# Patient Record
Sex: FEMALE | Race: WHITE | NOT HISPANIC OR LATINO | Employment: FULL TIME | URBAN - METROPOLITAN AREA
[De-identification: names, ages, dates, MRNs, and addresses within clinical notes are randomized per-mention and may not be internally consistent; named-entity substitution may affect disease eponyms.]

---

## 2020-11-02 ENCOUNTER — HOSPITAL ENCOUNTER (EMERGENCY)
Facility: CLINIC | Age: 30
Discharge: HOME | End: 2020-11-02
Attending: FAMILY MEDICINE | Admitting: FAMILY MEDICINE
Payer: OTHER MISCELLANEOUS

## 2020-11-02 ENCOUNTER — APPOINTMENT (OUTPATIENT)
Dept: GENERAL RADIOLOGY | Facility: CLINIC | Age: 30
End: 2020-11-02
Attending: FAMILY MEDICINE
Payer: OTHER MISCELLANEOUS

## 2020-11-02 VITALS
SYSTOLIC BLOOD PRESSURE: 130 MMHG | DIASTOLIC BLOOD PRESSURE: 85 MMHG | TEMPERATURE: 99.1 F | RESPIRATION RATE: 16 BRPM | HEART RATE: 60 BPM | OXYGEN SATURATION: 97 % | WEIGHT: 153 LBS | HEIGHT: 63 IN | BODY MASS INDEX: 27.11 KG/M2

## 2020-11-02 PROCEDURE — 99282 EMERGENCY DEPT VISIT SF MDM: CPT | Performed by: FAMILY MEDICINE

## 2020-11-02 PROCEDURE — 29125 APPL SHORT ARM SPLINT STATIC: CPT | Mod: LT | Performed by: FAMILY MEDICINE

## 2020-11-02 PROCEDURE — 73130 X-RAY EXAM OF HAND: CPT | Mod: LT

## 2020-11-02 PROCEDURE — 250N000013 HC RX MED GY IP 250 OP 250 PS 637: Performed by: FAMILY MEDICINE

## 2020-11-02 PROCEDURE — 99284 EMERGENCY DEPT VISIT MOD MDM: CPT | Mod: 25 | Performed by: FAMILY MEDICINE

## 2020-11-02 RX ORDER — OXYCODONE HYDROCHLORIDE 5 MG/1
5 TABLET ORAL EVERY 6 HOURS PRN
Qty: 12 TABLET | Refills: 0 | Status: SHIPPED | OUTPATIENT
Start: 2020-11-02 | End: 2023-08-13

## 2020-11-02 RX ORDER — IBUPROFEN 400 MG/1
400 TABLET, FILM COATED ORAL ONCE
Status: COMPLETED | OUTPATIENT
Start: 2020-11-02 | End: 2020-11-02

## 2020-11-02 RX ORDER — ACETAMINOPHEN 500 MG
1000 TABLET ORAL ONCE
Status: COMPLETED | OUTPATIENT
Start: 2020-11-02 | End: 2020-11-02

## 2020-11-02 RX ADMIN — IBUPROFEN 400 MG: 400 TABLET ORAL at 04:25

## 2020-11-02 RX ADMIN — ACETAMINOPHEN 1000 MG: 500 TABLET, FILM COATED ORAL at 04:25

## 2020-11-02 ASSESSMENT — MIFFLIN-ST. JEOR: SCORE: 1373.13

## 2020-11-02 NOTE — DISCHARGE INSTRUCTIONS
Thumb spica splint until seen in sports medicine clinic with Dr. Han.  In addition to the thumb spica splint rest, ice, elevation as possible over the next 48 hours.  Workability given.  Tylenol/ibuprofen as baseline pain medication, would recommend the sparing use of oxycodone if needed for breakthrough pain.  Return to the emergency department if worse or changes.

## 2020-11-02 NOTE — ED PROVIDER NOTES
"  History   No chief complaint on file.  Hand injury  HPI  Marilou Nicolas is a 30 year old female, past medical history is unremarkable, presents to the emergency department with concerns of injury to her left hand at around 330 this morning while at work.  The patient reports she was lifting a pallet and pushed back against her causing forced extension of the left thumb she heard a cracking snap and had immediate pain in the area of the thenar eminence by description.  No other injuries.  She has taken nothing for pain.      Allergies:  Allergies   Allergen Reactions     Penicillins Other (See Comments)       Problem List:    There are no active problems to display for this patient.       Past Medical History:    No past medical history on file.    Past Surgical History:    No past surgical history on file.    Family History:    No family history on file.    Social History:  Marital Status:   [2]  Social History     Tobacco Use     Smoking status: Not on file   Substance Use Topics     Alcohol use: Not on file     Drug use: Not on file        Medications:         oxyCODONE (ROXICODONE) 5 MG tablet          Review of Systems   All other systems reviewed and are negative.      Physical Exam   BP: (!) 151/96  Pulse: 60  Temp: 99.1  F (37.3  C)  Resp: 18  Height: 160 cm (5' 3\")  Weight: 69.4 kg (153 lb)  SpO2: 99 %      Physical Exam  Vitals signs and nursing note reviewed.   Constitutional:       Appearance: Normal appearance.   Musculoskeletal:        Hands:    Neurological:      Mental Status: She is alert.         ED Course        Procedures               Critical Care time:  none               Results for orders placed or performed during the hospital encounter of 11/02/20 (from the past 24 hour(s))   XR Hand Left G/E 3 Views    Narrative    EXAM: XR HAND LT G/E 3 VW  LOCATION[facility]  DATE/TIME: 11/2/2020 4:50 AM    INDICATION: Hyperextension of the thumb.  COMPARISON: None.      " Impression    IMPRESSION: No displaced fracture or dislocation. Consider MRI for further evaluation if there is concern for ligamentous or tendinous injury.       Medications   ibuprofen (ADVIL/MOTRIN) tablet 400 mg (400 mg Oral Given 11/2/20 0425)   acetaminophen (TYLENOL) tablet 1,000 mg (1,000 mg Oral Given 11/2/20 0425)     5:32 AM  Patient is relatively comfortable after medications given.  We reviewed her negative x-ray.  She is still unwilling to move the thumb voluntarily.    Assessments & Plan (with Medical Decision Making)   30-year-old female past medical history reviewed as above who presents with a work-related injury to her left thumb as described in the HPI documented with respect to limited exam given patient discomfort and cooperation.  X-ray of the traumatized area is acutely negative however by her description would be suspicious of potential of tissue injury such as tendon tear or rupture.  Difficult to examine acutely given the patient cooperation, I recommended thumb spica splint, rest ice compression elevation and follow-up in clinic with sports medicine after this is had a chance to settle down a little bit.  At that time a better exam can be performed in a decision as to further potential soft tissue imaging such as MRI considered.  I have given her referral to Dr. Michael Han sports medicine.  Recommended nonnarcotic pain medication such as Tylenol/ibuprofen as baseline and oxycodone for breakthrough pain.  Return to the emergency department if worse or changes in interim.  Workability was completed.    Disclaimer: This note consists of symbols derived from keyboarding, dictation and/or voice recognition software. As a result, there may be errors in the script that have gone undetected. Please consider this when interpreting information found in this chart.      I have reviewed the nursing notes.    I have reviewed the findings, diagnosis, plan and need for follow up with the patient.        New Prescriptions    OXYCODONE (ROXICODONE) 5 MG TABLET    Take 1 tablet (5 mg) by mouth every 6 hours as needed for pain       Final diagnoses:   Strain of tendon of thumb       11/2/2020   [facility] EMERGENCY DEPT     [facility] CenterSteven reed MD  11/04/20 2918

## 2020-11-02 NOTE — ED TRIAGE NOTES
Pt c/o  Left hand/arm pain. Pt stated while at work approximately 0330  Was lifting a pallet and felt her hand/arm snap. Rating pain 10/10 on the pain scale . Pt able to move all the fingers except the thumb.

## 2020-11-03 ENCOUNTER — OFFICE VISIT (OUTPATIENT)
Dept: ORTHOPEDICS | Facility: CLINIC | Age: 30
End: 2020-11-03
Payer: OTHER MISCELLANEOUS

## 2020-11-03 VITALS
BODY MASS INDEX: 27.11 KG/M2 | WEIGHT: 153 LBS | DIASTOLIC BLOOD PRESSURE: 80 MMHG | HEIGHT: 63 IN | SYSTOLIC BLOOD PRESSURE: 130 MMHG

## 2020-11-03 DIAGNOSIS — S69.92XA INJURY OF LEFT THUMB, INITIAL ENCOUNTER: Primary | ICD-10-CM

## 2020-11-03 PROCEDURE — 99204 OFFICE O/P NEW MOD 45 MIN: CPT | Performed by: PEDIATRICS

## 2020-11-03 ASSESSMENT — MIFFLIN-ST. JEOR: SCORE: 1373.13

## 2020-11-03 NOTE — LETTER
REPORT OF WORK ABILITY    NOTE TO EMPLOYEE: You must promptly provide a copy of this report to your  employer or worker's compensation insurer, and Qualified Rehabilitation Consultant.    Date: 11/3/2020                     Employee Name: Marilou Nicolas         YOB: 1990  Medical Record Number: [id number]   Soc.Sec.No: (Not on file)  Employer: None                Date of Injury: 11/2/20  Managed Care Organization / Insurance Company Name: UNKNOWN    Diagnosis: left thumb injury, concern for soft tissue injury at thumb MCP joint  Work Related: yes     MMI: NO   Permanent Partial Disability(PPD) likely: UNKNOWN    EMPLOYEE IS ABLE TO WORK: Return to work with restrictions on next scheduled work date.     RESTRICTIONS IF ANY:    Lift, carry: none on left  Push/Pull:  None on left  Hand/Wrist:  left no gripping/grasping, no outstretched arms, no repetitive motion and No operating machine/vibrating tools  Basically no use left hand.  Ladder/Stair climb:  Not at all (0 hours) ladders  Reach Above Shoulders:  Not at all (0 hours) left    OTHER RESTRICTIONS: None    TREATMENT PLAN/NOTES: change work position for comfort, best work height mid chest to mid thigh, may need to restrict work activities for comfort, MRI - as ordered, continue thumb spica wrist brace and cold pack for comfort, Elevate and Rest.            Michael COMBS.

## 2020-11-03 NOTE — LETTER
11/3/2020         RE: Marilou Nicolas  [address] [place] [address]        Dear Colleague,    Thank you for referring your patient, Marilou Nicolas, to the [facility]. Please see a copy of my visit note below.    Sports Medicine Clinic Visit    PCP: [facility]    Marilou Nicolas is a 30 year old female who is seen  as an ER referral presenting with a left thumb injury that occurred at work yesterday 11/2/20.  She was lifting a pallet when it caused her left thumb to hyperextend.  She heard a crack and a snap and had pain her thumb.  She was seen at the ER, x rays were taken and she was placed in a thumb spica wrist brace.  It was recommended she have an MRI.   She is right hand dominant.     Injury: forced hyperextension of thumb  **  Felt and heard a crack at time of injury, noting that thumb was hyperextended. + pain and swelling right away.  No additional noise since injury.  Has had some bruising.    Injury is work comp.    Location of Pain: left thumb MCP joint   Duration of Pain: 1 day(s)  Rating of Pain at worst: 10/10  Rating of Pain Currently: 7/10  Symptoms are better with: Ice, Rest and Elevation  Symptoms are worse with: movement, use  Additional Features:   Positive: popping   Negative: swelling, bruising, grinding, catching, locking, instability, paresthesias, numbness, weakness, pain in other joints and systemic symptoms  Other evaluation and/or treatments so far consists of: x rays, splinting   Prior History of related problems: HX of laceration at thenar eminence ~11 years, no previous injury to this joint    Social History: [employer]     Review of Systems  Musculoskeletal: as above  Remainder of review of systems is negative including constitutional, CV, pulmonary, GI, Skin and Neurologic except as noted in HPI or medical history.    History reviewed. No pertinent past medical history.  Past Surgical History:  Continue Lasix and Cardizem "  Procedure Laterality Date     KIDNEY SURGERY  2016     Fam hx: noncontributory    Social History     Socioeconomic History     Marital status:      Spouse name: Not on file     Number of children: Not on file     Years of education: Not on file     Highest education level: Not on file   Occupational History     Not on file   Social Needs     Financial resource strain: Not on file     Food insecurity     Worry: Not on file     Inability: Not on file     Transportation needs     Medical: Not on file     Non-medical: Not on file   Tobacco Use     Smoking status: Never Smoker     Smokeless tobacco: Never Used   Substance and Sexual Activity     Alcohol use: Not on file     Drug use: Not on file     Sexual activity: Not on file   Lifestyle     Physical activity     Days per week: Not on file     Minutes per session: Not on file     Stress: Not on file   Relationships     Social connections     Talks on phone: Not on file     Gets together: Not on file     Attends Yazidism service: Not on file     Active member of club or organization: Not on file     Attends meetings of clubs or organizations: Not on file     Relationship status: Not on file     Intimate partner violence     Fear of current or ex partner: Not on file     Emotionally abused: Not on file     Physically abused: Not on file     Forced sexual activity: Not on file   Other Topics Concern     Not on file   Social History Narrative     Not on file       Objective  /80   Ht 1.6 m (5' 3\")   Wt 69.4 kg (153 lb)   BMI 27.10 kg/m      GENERAL APPEARANCE: healthy, alert and no distress   GAIT: NORMAL  SKIN: no suspicious lesions or rashes  NEURO: Normal strength and tone, mentation intact and speech normal  PSYCH:  mentation appears normal and affect normal/bright  HEENT: no scleral icterus  CV: distal perfusion intact  RESP: nonlabored breathing    Exam:    Hand/wrist (left):    Inspection:  No deformity noted.  Mild diffuse radial hand swelling, " more around thumb MCP joint and into thumb. Some thumb MCP joint area ecchymosis.    Motion:  Forearm, wrist grossly full  Digit motion quite limited thumb actively; has minimal active flexion, extension, slightly greater abduction/adduction at thumb; all limited with pain    Strength:  deferred    Sensation:  Grossly intact light touch.    Radial pulses normal, +2/4, capillary refill brisk.    Palpation:  Tender diffuse radial hand, more around thumb MCP joint    Unable to perform stress at thumb MCP collaterals with pain      Skin:       well perfused       capillary refill brisk    Radiology:  Visualized radiographs as noted below, and reviewed the images with the patient; if the report was available at the time of the visit, the report was reviewed as well.      Results for orders placed or performed during the hospital encounter of 11/02/20   XR Hand Left G/E 3 Views    Narrative    EXAM: XR HAND LT G/E 3 VW  LOCATION: [facility]  DATE/TIME: 11/2/2020 4:50 AM    INDICATION: Hyperextension of the thumb.  COMPARISON: None.      Impression    IMPRESSION: No displaced fracture or dislocation. Consider MRI for further evaluation if there is concern for ligamentous or tendinous injury.       Assessment:  1. Injury of left thumb, initial encounter         Plan:  Discussed the assessment with the patient.  Concern for thumb MCP joint derangement given nature of the injury, current symptoms.  This may include gamekeeper injury.  We discussed the following: symptom treatment, activity modification/rest, imaging and support for the affected area. Following discussion, plan:  Icing, elevation, OTC medication as needed.  She already has pain medication from ED.  We discussed primarily options of monitoring, routine use of thumb spica wrist brace, and recheck; versus additional imaging with MRI.  Given the assessment, with concern for joint integrity, plan MRI of the area next.  Order placed.  Limit work  activities in the meantime; workability letter provided today.  Follow up: Contact patient with MRI results.  Questions answered. Discussed signs and symptoms that may indicate more serious issues; the patient was instructed to seek appropriate care if noted. Marilou indicates understanding of these issues and agrees with the plan.      Michael Montoya DO, CAQ                Patient Instructions   Continue with brace for support  Icing, elevation, rest from use of hand  Letter for work  Plan MRI of this area next; will plan to contact you with results     Advanced imaging is done by appointment. Some insurance require a prior authorization to be completed which may delay the time until you are able to schedule your appointment.    [facility] [place] [place] [facility] [phone] to schedule your MRI.  Depending on your availability you can usually schedule within the next 1-2 days.    The clinic will call you with results, if you have not heard from the clinic within 3-4 days following your MRI please contact us at the number listed below.   If you have any further questions for your physician or physician s care team you can call [phone] and use option 3 to leave a voice message. Calls received during business hours will be returned same day.                This note consists of symbols derived from keyboarding, dictation and/or voice recognition software. As a result, there may be errors in the script that have gone undetected. Please consider this when interpreting information found in this chart.          Again, thank you for allowing me to participate in the care of your patient.        Sincerely,        Michael Montoya DO

## 2020-11-03 NOTE — PROGRESS NOTES
Sports Medicine Clinic Visit    PCP: [facility], [facility]    Marilou Nicolas is a 30 year old female who is seen  as an ER referral presenting with a left thumb injury that occurred at work yesterday 11/2/20.  She was lifting a pallet when it caused her left thumb to hyperextend.  She heard a crack and a snap and had pain her thumb.  She was seen at the ER, x rays were taken and she was placed in a thumb spica wrist brace.  It was recommended she have an MRI.   She is right hand dominant.     Injury: forced hyperextension of thumb  **  Felt and heard a crack at time of injury, noting that thumb was hyperextended. + pain and swelling right away.  No additional noise since injury.  Has had some bruising.    Injury is work comp.    Location of Pain: left thumb MCP joint   Duration of Pain: 1 day(s)  Rating of Pain at worst: 10/10  Rating of Pain Currently: 7/10  Symptoms are better with: Ice, Rest and Elevation  Symptoms are worse with: movement, use  Additional Features:   Positive: popping   Negative: swelling, bruising, grinding, catching, locking, instability, paresthesias, numbness, weakness, pain in other joints and systemic symptoms  Other evaluation and/or treatments so far consists of: x rays, splinting   Prior History of related problems: HX of laceration at thenar eminence ~11 years, no previous injury to this joint    Social History: [employer]     Review of Systems  Musculoskeletal: as above  Remainder of review of systems is negative including constitutional, CV, pulmonary, GI, Skin and Neurologic except as noted in HPI or medical history.    History reviewed. No pertinent past medical history.  Past Surgical History:   Procedure Laterality Date     KIDNEY SURGERY  2016     [facility] hx: noncontributory    Social History     Socioeconomic History     Marital status:      Spouse name: Not on file     Number of children: Not on file     Years of education: Not on file     Highest education  "level: Not on file   Occupational History     Not on file   Social Needs     Financial resource strain: Not on file     Food insecurity     Worry: Not on file     Inability: Not on file     Transportation needs     Medical: Not on file     Non-medical: Not on file   Tobacco Use     Smoking status: Never Smoker     Smokeless tobacco: Never Used   Substance and Sexual Activity     Alcohol use: Not on file     Drug use: Not on file     Sexual activity: Not on file   Lifestyle     Physical activity     Days per week: Not on file     Minutes per session: Not on file     Stress: Not on file   Relationships     Social connections     Talks on phone: Not on file     Gets together: Not on file     Attends Buddhist service: Not on file     Active member of club or organization: Not on file     Attends meetings of clubs or organizations: Not on file     Relationship status: Not on file     Intimate partner violence     Fear of current or ex partner: Not on file     Emotionally abused: Not on file     Physically abused: Not on file     Forced sexual activity: Not on file   Other Topics Concern     Not on file   Social History Narrative     Not on file       Objective  /80   Ht 1.6 m (5' 3\")   Wt 69.4 kg (153 lb)   BMI 27.10 kg/m      GENERAL APPEARANCE: healthy, alert and no distress   GAIT: NORMAL  SKIN: no suspicious lesions or rashes  NEURO: Normal strength and tone, mentation intact and speech normal  PSYCH:  mentation appears normal and affect normal/bright  HEENT: no scleral icterus  CV: distal perfusion intact  RESP: nonlabored breathing    Exam:    Hand/wrist (left):    Inspection:  No deformity noted.  Mild diffuse radial hand swelling, more around thumb MCP joint and into thumb. Some thumb MCP joint area ecchymosis.    Motion:  Forearm, wrist grossly full  Digit motion quite limited thumb actively; has minimal active flexion, extension, slightly greater abduction/adduction at thumb; all limited with " pain    Strength:  deferred    Sensation:  Grossly intact light touch.    Radial pulses normal, +2/4, capillary refill brisk.    Palpation:  Tender diffuse radial hand, more around thumb MCP joint    Unable to perform stress at thumb MCP collaterals with pain      Skin:       well perfused       capillary refill brisk    Radiology:  Visualized radiographs as noted below, and reviewed the images with the patient; if the report was available at the time of the visit, the report was reviewed as well.      Results for orders placed or performed during the hospital encounter of 11/02/20   XR Hand Left G/E 3 Views    Narrative    EXAM: XR HAND LT G/E 3 VW  LOCATION: [facility]  DATE/TIME: 11/2/2020 4:50 AM    INDICATION: Hyperextension of the thumb.  COMPARISON: None.      Impression    IMPRESSION: No displaced fracture or dislocation. Consider MRI for further evaluation if there is concern for ligamentous or tendinous injury.       Assessment:  1. Injury of left thumb, initial encounter         Plan:  Discussed the assessment with the patient.  Concern for thumb MCP joint derangement given nature of the injury, current symptoms.  This may include gamekeeper injury.  We discussed the following: symptom treatment, activity modification/rest, imaging and support for the affected area. Following discussion, plan:  Icing, elevation, OTC medication as needed.  She already has pain medication from ED.  We discussed primarily options of monitoring, routine use of thumb spica wrist brace, and recheck; versus additional imaging with MRI.  Given the assessment, with concern for joint integrity, plan MRI of the area next.  Order placed.  Limit work activities in the meantime; workability letter provided today.  Follow up: Contact patient with MRI results.  Questions answered. Discussed signs and symptoms that may indicate more serious issues; the patient was instructed to seek appropriate care if noted. Marilou  indicates understanding of these issues and agrees with the plan.      Michael Montoya, DO, CAQ                Patient Instructions   Continue with brace for support  Icing, elevation, rest from use of hand  Letter for work  Plan MRI of this area next; will plan to contact you with results     Advanced imaging is done by appointment. Some insurance require a prior authorization to be completed which may delay the time until you are able to schedule your appointment.    Please call [place], [place] and [place]: [phone] to schedule your MRI.  Depending on your availability you can usually schedule within the next 1-2 days.    The clinic will call you with results, if you have not heard from the clinic within 3-4 days following your MRI please contact us at the number listed below.   If you have any further questions for your physician or physician s care team you can call [phone] and use option 3 to leave a voice message. Calls received during business hours will be returned same day.                This note consists of symbols derived from keyboarding, dictation and/or voice recognition software. As a result, there may be errors in the script that have gone undetected. Please consider this when interpreting information found in this chart.

## 2020-11-03 NOTE — PATIENT INSTRUCTIONS
Continue with brace for support  Icing, elevation, rest from use of hand  Letter for work  Plan MRI of this area next; will plan to contact you with results     Advanced imaging is done by appointment. Some insurance require a prior authorization to be completed which may delay the time until you are able to schedule your appointment.    Please call [place], [place] and [place]: [phone] to schedule your MRI.  Depending on your availability you can usually schedule within the next 1-2 days.    The clinic will call you with results, if you have not heard from the clinic within 3-4 days following your MRI please contact us at the number listed below.   If you have any further questions for your physician or physician s care team you can call [phone] and use option 3 to leave a voice message. Calls received during business hours will be returned same day.

## 2020-11-04 ENCOUNTER — TELEPHONE (OUTPATIENT)
Dept: ORTHOPEDICS | Facility: CLINIC | Age: 30
End: 2020-11-04

## 2020-11-04 ENCOUNTER — HOSPITAL ENCOUNTER (OUTPATIENT)
Dept: MRI IMAGING | Facility: CLINIC | Age: 30
Discharge: HOME | End: 2020-11-04
Attending: PEDIATRICS | Admitting: PEDIATRICS
Payer: OTHER MISCELLANEOUS

## 2020-11-04 DIAGNOSIS — S69.92XA INJURY OF LEFT THUMB, INITIAL ENCOUNTER: ICD-10-CM

## 2020-11-04 PROCEDURE — 73221 MRI JOINT UPR EXTREM W/O DYE: CPT | Mod: 26 | Performed by: RADIOLOGY

## 2020-11-04 PROCEDURE — 73221 MRI JOINT UPR EXTREM W/O DYE: CPT | Mod: LT

## 2020-11-04 NOTE — TELEPHONE ENCOUNTER
Results for orders placed or performed during the hospital encounter of 11/04/20   MR Finger Left w/o Contrast    Narrative    MR left thumb without contrast 11/4/2020 9:40 AM    Techniques: Multiplanar multisequence imaging of the left hand with  field-of-view centered on the thumb was obtained without  administration of intra-articular or intravenous contrast using  routing protocol.    History: Pain at the first metacarpal phalangeal joint suspect  gamekeeper injury.    Comparison: Radiograph 11/2/2020.    Findings:    The proper ulnar collateral ligament is intact. The accessory ulnar  collateral ligament is intact. Mild regional soft tissue edema  superficial to the ulnar collateral ligament. There is mild regional  edema primarily at the level of the first metacarpal phalangeal joint,  especially volarly medially. The radial collateral ligament is intact.    Mild intramuscular edema in the opponens pollicis and flexor pollicis  brevis muscles.    Bones: No fracture or abnormal marrow infiltration.    Tendons:  Flexor tendons: Tenosynovial fluid surrounding the flexor hallucis  longus at the level of the distal metacarpal and metacarpal phalangeal  joint. No tear or tendon sheath effusion.  Extensor tendons: Tenosynovial fluid surrounding the extensor pollicis  brevis tendon. Remaining extensor tendons are intact.      Impression    IMPRESSION:    1. The proper and accessory ulnar collateral ligaments of the first  metacarpophalangeal joint are intact with surrounding soft tissue  edema suggestive of mild sprain. There is also mild regional soft  tissue edema around the first metacarpal phalangeal joint and strains  of opponens pollicis and flexor pollicis brevis muscles.    2. Mild tenosynovitis of the flexor pollicis longus and extensor  pollicis brevis tendons.    I have personally reviewed the examination and initial interpretation  and I agree with the findings.    TARUN TORRES MD (Joe)

## 2020-11-04 NOTE — LETTER
November 12, 2020      Marilou S Fidencio  [address] [place] [address]        Dear ,    We are writing to inform you of your test results.    Please make an appointment with your provider to review or follow up on your test results.  Appointments can be made by calling [phone].    No results found from the In stiQRd message.    If you have any questions or concerns, please call the clinic at the number listed above.       Sincerely,        Michael Montoya, DO

## 2020-11-04 NOTE — TELEPHONE ENCOUNTER
Strain/sprain. No tear noted.  Would advise hand therapy next. May use brace for comfort in the meantime.  Would continue with work restrictions until recheck, in 3-4 weeks; can update letter to reflect that timeline if needed.  Thanks.  Michael Montoya, DO, CAQ

## 2020-11-05 NOTE — TELEPHONE ENCOUNTER
Tried contacting patient to review results.  No answer and patient's voicemail was full, unable to leave message.  Will have clinic staff try contacting patient later today.       Phone Number patient can be reached at:  Cell number on file:    Telephone Information:   Mobile [phone]     Jaylen Carrizales ATC

## 2020-11-06 ENCOUNTER — TELEPHONE (OUTPATIENT)
Dept: ORTHOPEDICS | Facility: CLINIC | Age: 30
End: 2020-11-06

## 2020-11-06 NOTE — TELEPHONE ENCOUNTER
Reason for call:  Other   Patient called regarding (reason for call): Pt has been trying to reach provider regarding image results  Additional comments:     Phone number to reach patient:  Home number on file [phone] (home)    Best Time:  Morning or evening    Can we leave a detailed message on this number?  YES    Travel screening: Not Applicable

## 2020-11-09 NOTE — TELEPHONE ENCOUNTER
Attempted to call both home (mailbox full) and mobile numbers (busy).  Unable to reach patient.  Will attempt again later  Cassandra Dawson MS ATC

## 2020-11-10 NOTE — TELEPHONE ENCOUNTER
Attempted to contact patient, unable to leave message as mailbox is full.     Cassandra Dawson MS ATC

## 2020-11-10 NOTE — TELEPHONE ENCOUNTER
Telephone message sent to incorrect pool:      Reason for call:  Other   Patient called regarding (reason for call): Pt has been trying to reach provider regarding image results  Additional comments:     Phone number to reach patient:  Home number on file [phone] (home)    Best Time:  Morning or evening    Can we leave a detailed message on this number?  YES    Travel screening: Not Applicable

## 2020-11-13 NOTE — TELEPHONE ENCOUNTER
Sent patient MyChart, asking if she would like to receive results via [facility]. Will wait for response.    Sintia Queen, ATC

## 2020-11-16 ENCOUNTER — HEALTH MAINTENANCE LETTER (OUTPATIENT)
Age: 30
End: 2020-11-16

## 2020-11-24 ENCOUNTER — THERAPY VISIT (OUTPATIENT)
Dept: OCCUPATIONAL THERAPY | Facility: CLINIC | Age: 30
End: 2020-11-24
Attending: PEDIATRICS
Payer: OTHER MISCELLANEOUS

## 2020-11-24 DIAGNOSIS — S69.92XA INJURY OF LEFT THUMB, INITIAL ENCOUNTER: ICD-10-CM

## 2020-11-24 DIAGNOSIS — M79.645 PAIN OF LEFT THUMB: Primary | ICD-10-CM

## 2020-11-24 PROCEDURE — 97165 OT EVAL LOW COMPLEX 30 MIN: CPT | Mod: GO | Performed by: OCCUPATIONAL THERAPIST

## 2020-11-24 PROCEDURE — 97110 THERAPEUTIC EXERCISES: CPT | Mod: GO | Performed by: OCCUPATIONAL THERAPIST

## 2020-11-24 PROCEDURE — 97760 ORTHOTIC MGMT&TRAING 1ST ENC: CPT | Mod: GO | Performed by: OCCUPATIONAL THERAPIST

## 2020-11-24 NOTE — PROGRESS NOTES
Hand Therapy Initial Evaluation    Current Date:  11/24/2020    Subjective:  Marilou Nicolas is a 30 year old right hand dominant female.    Diagnosis:   Left thumb injury  DOI:  11/2/2020  Post:  3w 1d    Patient reports symptoms of pain, stiffness/loss of motion, weakness/loss of strength, edema and numbness of the left thumb which occurred due to hyperextending thumb while lifting at work. Since onset symptoms are gradually getting better.  Special tests:  x-ray negative for fracture and MRI negative for UCL injury but mild tenosynovitis of FPL and EPB.  Previous treatment: OTC SUSANNA thumb spica full time.  General health as reported by patient is good.  Pertinent medical history includes:Depression, Kidney Disease, Migraines/Headaches  Medical allergies:see list in EMR.  Surgical history: other: kidney.  Medication history: None.    Occupational Profile Information:  Current occupation is over   Currently working in normal job with restrictions  Job Tasks: Lifting, Carrying, Prolonged Standing, Pushing, Pulling, Repetitive Tasks  Prior functional level:  independent-all household chores  Barriers include:none  Mobility: No difficulty  Transportation: drives    Functional Outcome Measure:  Upper Extremity Functional Index  SCORE:   Column Totals: 32/80  (A lower score indicates greater disability.)    Objective:  Pain Level (Scale 0-10):   11/24/2020   At Rest 4-5   With Use 7-8   Worst 10     Pain Description:  Date 11/24/2020   Location wrist and thumb   Pain Quality Sharp and numb   Frequency constant     Pain is worst  daytime   Exacerbated by  using thumb, cold   Relieved by rest   Progression Gradually improving      Sensation   Decreased in tip of thumb per pt report but has improved since DOI    Edema  Mild of thumb, swelling has decreased since DOI per pt report     ROM  Thumb 11/24/2020 11/24/2020   AROM  (PROM) Right Left   MP 0/30 0/10   IP +15/75 0/5   RABD 53 25   PABD 45 25    Retropulsion 5 cm 2 cm   Kapandji Opposition Scale (0-10/10) 9/10 4/10     Wrist 11/24/2020 11/24/2020   AROM (PROM) Right Left   Extension 65 40   Flexion 65 25   RD 25 20   UD 35 35     Resisted Testing  Pain Report: - none  + mild    ++ moderate    +++ severe    11/24/2020   APL +   EPB +   EPL +   FPL + slight     Strength   (Measured in pounds)  Deferred due to pain    Palpation  Pain Report:  - none    + mild    ++ moderate    +++ severe    11/24/2020   Radial Styloid -   1st DC -   FCR + slight   Thumb CMC + slight   A1 pulley thumb +   Extensor Wad -   Thenars +   Thumb MCP +   Thumb IP +     Assessment:  Patient presents with symptoms consistent with diagnosis of left thumb injury, with conservative intervention.     Patient's limitations or Problem List includes:  Pain, Decreased ROM/motion, Increased edema, Weakness and Sensory disturbance of the left thumb and wrist which interferes with the patient's ability to perform Self Care Tasks (dressing, eating, bathing), Work Tasks, Sleep Patterns, Recreational Activities, Household Chores and Driving  as compared to previous level of function.    Rehab Potential:  Excellent - Return to full activity, no limitations    Patient will benefit from skilled Occupational Therapy to increase ROM, overall strength,  strength, pinch strength and sensation and decrease pain and edema to return to previous activity level and resume normal daily tasks and to reach their rehab potential.    Barriers to Learning:  No barrier    Communication Issues:  Patient appears to be able to clearly communicate and understand verbal and written communication and follow directions correctly.    Chart Review: Chart Review and Simple history review with patient    Identified Performance Deficits: bathing/showering, dressing, hygiene and grooming, driving and community mobility, home establishment and management, meal preparation and cleanup, sleep, work and leisure activities     Assessment of Occupational Performance:  5 or more Performance Deficits    Clinical Decision Making (Complexity): Low complexity    Treatment Explanation:  The following has been discussed with the patient:  RX ordered/plan of care  Anticipated outcomes  Possible risks and side effects    Plan:  Frequency:  1 X week, once daily  Duration:  for 6 weeks    Treatment Plan:    Modalities:    US and Paraffin   Therapeutic Exercise:    AROM, AAROM, PROM, Tendon Gliding, Blocking, Isotonics, Isometrics and Stabilization  Neuromuscular re-ed:   Nerve Gliding and Kinesiotaping  Manual Techniques:   Friction massage and Myofascial release  Orthotic Fabrication:    Forearm based orthosis  Self Care:    Self Care Tasks    Discharge Plan:  Achieve all LTG.  Independent in home treatment program.  Reach maximal therapeutic benefit.    Discharge Plan:    Achieve all LTG.  Independent in home treatment program.  Reach maximal therapeutic benefit.    Home Program:   Warmth for stiffness  Ice after activity for pain  Self TFM to 1st dorsal compartment  Self MFR to EPB/ABL  AROM of wrist and thumb  Ot[place]ock thumb spica orthosis sleeping and per symptoms day  Avoid activities that exacerbate pain in the wrist or thumb    Next Visit:  See in 1 week  Assess response to HEP and otobock orthosis  Progress to PROM for thumb and wrist and strengthening as tolerated

## 2020-12-01 ENCOUNTER — THERAPY VISIT (OUTPATIENT)
Dept: OCCUPATIONAL THERAPY | Facility: CLINIC | Age: 30
End: 2020-12-01
Payer: OTHER MISCELLANEOUS

## 2020-12-01 DIAGNOSIS — M79.645 PAIN OF LEFT THUMB: ICD-10-CM

## 2020-12-01 DIAGNOSIS — S69.92XA INJURY OF LEFT THUMB, INITIAL ENCOUNTER: ICD-10-CM

## 2020-12-01 PROCEDURE — 97140 MANUAL THERAPY 1/> REGIONS: CPT | Mod: GO | Performed by: OCCUPATIONAL THERAPIST

## 2020-12-01 PROCEDURE — 97110 THERAPEUTIC EXERCISES: CPT | Mod: GO | Performed by: OCCUPATIONAL THERAPIST

## 2020-12-01 NOTE — PROGRESS NOTES
SOAP note objective information for 12/1/2020:    Diagnosis:   Left thumb injury  DOI:  11/2/2020  Post:  4w 1d    Pain Level (Scale 0-10):   11/24/2020 12/1/2020   At Rest 4-5 4-5   With Use 7-8 5-6   Worst 10 8     ROM  Thumb 11/24/2020 11/24/2020 12/1/2020   AROM  (PROM) Right Left Left   MP 0/30 0/10 /25   IP +15/75 0/5 /20   RABD 53 25    PABD 45 25    Retropulsion 5 cm 2 cm    Kapandji Opposition Scale (0-10/10) 9/10 4/10 6/10     Wrist 11/24/2020 11/24/2020 12/1/2020   AROM (PROM) Right Left Left   Extension 65 40 65   Flexion 65 25 50   RD 25 20    UD 35 35      Home Program:   Warmth for stiffness  Ice after activity for pain  Self TFM to 1st dorsal compartment  Self MFR to EPB/ABL and thenars   AROM of wrist and thumb  Ot[place] thumb spica orthosis sleeping and per symptoms day  Avoid activities that exacerbate pain in the wrist or thumb    Next Visit:  See in 1 week  Assess response to MFR to thenars, consider US if continued tenderness  Progress to PROM for thumb and wrist and strengthening as tolerated     Please refer to the daily flowsheet for treatment today, total treatment time and time spent performing 1:1 timed codes.

## 2020-12-08 ENCOUNTER — THERAPY VISIT (OUTPATIENT)
Dept: OCCUPATIONAL THERAPY | Facility: CLINIC | Age: 30
End: 2020-12-08
Payer: OTHER MISCELLANEOUS

## 2020-12-08 DIAGNOSIS — S69.92XA INJURY OF LEFT THUMB, INITIAL ENCOUNTER: ICD-10-CM

## 2020-12-08 DIAGNOSIS — M79.645 PAIN OF LEFT THUMB: ICD-10-CM

## 2020-12-08 PROCEDURE — 97140 MANUAL THERAPY 1/> REGIONS: CPT | Mod: GO | Performed by: OCCUPATIONAL THERAPIST

## 2020-12-08 PROCEDURE — 97110 THERAPEUTIC EXERCISES: CPT | Mod: GO | Performed by: OCCUPATIONAL THERAPIST

## 2020-12-08 NOTE — PROGRESS NOTES
SOAP note objective information for 12/8/2020:    Diagnosis:   Left thumb injury  DOI:  11/2/2020  Post:  5w 1d    Pain Level (Scale 0-10):   11/24/2020 12/1/2020 12/8/2020   At Rest 4-5 4-5 0   With Use 7-8 5-6 5   Worst 10 8 5     ROM  Thumb 11/24/2020 11/24/2020 12/1/2020 12/8/2020   AROM  (PROM) Right Left Left Left   MP 0/30 0/10 /25 /20   IP +15/75 0/5 /20 /55   RABD 53 25     PABD 45 25     Retropulsion 5 cm 2 cm     Kapandji Opposition Scale (0-10/10) 9/10 4/10 6/10 7/10     Wrist 11/24/2020 11/24/2020 12/1/2020 12/8/2020   AROM (PROM) Right Left Left Left   Extension 65 40 65 65   Flexion 65 25 50 60   RD 25 20     UD 35 35       Strength   (Measured in pounds)  Pain Report: - none  + mild    ++ moderate    +++ severe    12/8/2020 12/8/2020   Trials Right Left   1  2  3 54  52  44 12-  14-  15-   Average 50 14     Lat Pinch 12/8/2020 12/8/2020   Trials Right Left   1  2  3 16  16  16 7+  9+  10+   Average 16 9     3 Pt Pinch 12/8/2020 12/8/2020   Trials Right Left   1  2  3 12  12  11 5+  5+  5+   Average 12 5     Home Program:   Warmth for stiffness  Ice after activity for pain  Self TFM to 1st dorsal compartment  Self MFR to EPB/ABL and thenars   AROM of wrist and thumb   and pinch strengthening  [place] thumb spica orthosis sleeping and per symptoms day, taper use as tolerated   Avoid activities that exacerbate pain in the wrist or thumb    Next Visit:  See in 1 week  Assess response to  and pinch strengthening  Defer PROM for thumb and wrist as ROM improving  Progress to thumb isotonics all planes and thumb stabilization exercises     Please refer to the daily flowsheet for treatment today, total treatment time and time spent performing 1:1 timed codes.

## 2020-12-15 ENCOUNTER — THERAPY VISIT (OUTPATIENT)
Dept: OCCUPATIONAL THERAPY | Facility: CLINIC | Age: 30
End: 2020-12-15
Payer: OTHER MISCELLANEOUS

## 2020-12-15 DIAGNOSIS — M79.645 PAIN OF LEFT THUMB: ICD-10-CM

## 2020-12-15 DIAGNOSIS — S69.92XA INJURY OF LEFT THUMB, INITIAL ENCOUNTER: ICD-10-CM

## 2020-12-15 PROCEDURE — 97140 MANUAL THERAPY 1/> REGIONS: CPT | Mod: GO | Performed by: OCCUPATIONAL THERAPIST

## 2020-12-15 PROCEDURE — 97110 THERAPEUTIC EXERCISES: CPT | Mod: GO | Performed by: OCCUPATIONAL THERAPIST

## 2020-12-15 NOTE — PROGRESS NOTES
"SOAP note objective information for 12/15/2020:    Diagnosis:   Left thumb injury  DOI:  11/2/2020  Post:  6w 1d    Pain Level (Scale 0-10):   11/24/2020 12/1/2020 12/8/2020 12/15/2020   At Rest 4-5 4-5 0    With Use 7-8 5-6 5 5   Worst 10 8 5 5     ROM  Thumb 11/24/2020 11/24/2020 12/1/2020 12/8/2020 12/15/2020   AROM  (PROM) Right Left Left Left Left   MP 0/30 0/10 /25 /20 /25   IP +15/75 0/5 /20 /55 /70   RABD 53 25      PABD 45 25      Retropulsion 5 cm 2 cm      Kapandji Opposition Scale (0-10/10) 9/10 4/10 6/10 7/10 8/10     Wrist 11/24/2020 11/24/2020 12/1/2020 12/8/2020   AROM (PROM) Right Left Left Left   Extension 65 40 65 65   Flexion 65 25 50 60   RD 25 20     UD 35 35       Strength   (Measured in pounds)  Pain Report: - none  + mild    ++ moderate    +++ severe    12/8/2020 12/8/2020   Trials Right Left   1  2  3 54  52  44 12-  14-  15-   Average 50 14     Lat Pinch 12/8/2020 12/8/2020   Trials Right Left   1  2  3 16  16  16 7+  9+  10+   Average 16 9     3 Pt Pinch 12/8/2020 12/8/2020   Trials Right Left   1  2  3 12  12  11 5+  5+  5+   Average 12 5     Home Program:   Warmth for stiffness  Ice after activity for pain  Self TFM to 1st dorsal compartment  Self MFR to EPB/ABL and thenars   AROM of wrist and thumb  Thumb stabilization with hard \"C\" and isometric 1st DI   and pinch strengthening  [place] thumb spica orthosis sleeping and per symptoms day, taper use as tolerated   Avoid activities that exacerbate pain in the wrist or thumb    Next Visit:  See in 1 week x 2 remaining visits   Assess response to thumb stabilization exercises   Progress to thumb isotonics all planes    Please refer to the daily flowsheet for treatment today, total treatment time and time spent performing 1:1 timed codes.   "

## 2020-12-22 ENCOUNTER — THERAPY VISIT (OUTPATIENT)
Dept: OCCUPATIONAL THERAPY | Facility: CLINIC | Age: 30
End: 2020-12-22
Payer: OTHER MISCELLANEOUS

## 2020-12-22 DIAGNOSIS — S69.92XA INJURY OF LEFT THUMB, INITIAL ENCOUNTER: ICD-10-CM

## 2020-12-22 DIAGNOSIS — M79.645 PAIN OF LEFT THUMB: ICD-10-CM

## 2020-12-22 PROCEDURE — 97110 THERAPEUTIC EXERCISES: CPT | Mod: GO | Performed by: OCCUPATIONAL THERAPIST

## 2020-12-22 PROCEDURE — 97140 MANUAL THERAPY 1/> REGIONS: CPT | Mod: GO | Performed by: OCCUPATIONAL THERAPIST

## 2020-12-22 NOTE — PROGRESS NOTES
"SOAP note objective information for 12/22/2020:    Diagnosis:   Left thumb injury  DOI:  11/2/2020  Post:  7w 1d    Pain Level (Scale 0-10):   11/24/2020 12/1/2020 12/8/2020 12/15/2020 12/22/2020   At Rest 4-5 4-5 0     With Use 7-8 5-6 5 5 3   Worst 10 8 5 5 7     ROM  Thumb 11/24/2020 11/24/2020 12/1/2020 12/8/2020 12/15/2020 12/22/2020   AROM  (PROM) Right Left Left Left Left Left   MP 0/30 0/10 /25 /20 /25 /30   IP +15/75 0/5 /20 /55 /70 /65   RABD 53 25       PABD 45 25       Retropulsion 5 cm 2 cm       Kapandji Opposition Scale (0-10/10) 9/10 4/10 6/10 7/10 8/10 9/10     Wrist 11/24/2020 11/24/2020 12/1/2020 12/8/2020   AROM (PROM) Right Left Left Left   Extension 65 40 65 65   Flexion 65 25 50 60   RD 25 20     UD 35 35       Strength   (Measured in pounds)  Pain Report: - none  + mild    ++ moderate    +++ severe    12/8/2020 12/8/2020 12/22/2020   Trials Right Left Left   1  2  3 54  52  44 12-  14-  15- 48  42  37   Average 50 14 42     Lat Pinch 12/8/2020 12/8/2020   Trials Right Left   1  2  3 16  16  16 7+  9+  10+   Average 16 9     3 Pt Pinch 12/8/2020 12/8/2020   Trials Right Left   1  2  3 12  12  11 5+  5+  5+   Average 12 5     Home Program:   Warmth for stiffness  Ice after activity for pain  Self TFM to 1st dorsal compartment  Self MFR to EPB/ABL and thenars   AROM of wrist and thumb  Thumb stabilization with hard \"C\" and isometric 1st DI   and pinch strengthening  Thumb isotonics all planes  [place] thumb spica orthosis sleeping and per symptoms day, taper use as tolerated     Next Visit:  See in 1 week x 1 remaining visit  Assess response to thumb isotonics  Progress Report and UEFI, anticipate discharge to HEP    Please refer to the daily flowsheet for treatment today, total treatment time and time spent performing 1:1 timed codes.   "

## 2020-12-29 ENCOUNTER — THERAPY VISIT (OUTPATIENT)
Dept: OCCUPATIONAL THERAPY | Facility: CLINIC | Age: 30
End: 2020-12-29
Payer: OTHER MISCELLANEOUS

## 2020-12-29 DIAGNOSIS — S69.92XA INJURY OF LEFT THUMB, INITIAL ENCOUNTER: ICD-10-CM

## 2020-12-29 DIAGNOSIS — M79.645 PAIN OF LEFT THUMB: ICD-10-CM

## 2020-12-29 PROCEDURE — 97110 THERAPEUTIC EXERCISES: CPT | Mod: GO | Performed by: OCCUPATIONAL THERAPIST

## 2020-12-29 PROCEDURE — 97535 SELF CARE MNGMENT TRAINING: CPT | Mod: GO | Performed by: OCCUPATIONAL THERAPIST

## 2020-12-29 NOTE — PROGRESS NOTES
Hand Therapy Progress/Discharge Note    Current Date:  12/29/2020    Reporting period is 11/24/2020 to 12/29/2020    Diagnosis:   Left thumb injury  DOI:  11/2/2020    Subjective:   Subjective changes noted by patient:  No longer needing to wear the splint. The thumb gets sore by the end of work day but overall it doing much better.  Functional changes noted by patient:  Improvement in Work Tasks  Patient has noted adverse reaction to:  None    Functional Outcome Measure:  Upper Extremity Functional Index  SCORE:   Column Totals: 74/80  (A lower score indicates greater disability.)    Objective:  Pain Level (Scale 0-10):   11/24/2020 12/29/2020   At Rest 4-5 0-3   With Use 7-8 3-6   Worst 10 6     Pain Description:  Date 11/24/2020 12/29/2020   Location wrist and thumb Thumb MP joint   Pain Quality Sharp and numb sharp   Frequency constant   Intermittent    Pain is worst  daytime daytime   Exacerbated by  using thumb, cold Overuse, cold   Relieved by rest rest   Progression Gradually improving  Improving      Sensation   Decreased intermittently in tip of thumb per pt report but has improved since DOI    Edema  Mild of thumb, swelling has decreased since DOI per pt report     ROM  Thumb 11/24/2020 11/24/2020 12/29/2020   AROM  (PROM) Right Left Left   MP 0/30 0/10 0/30   IP +15/75 0/5 +30/75   RABD 53 25 45   PABD 45 25 40   Retropulsion 5 cm 2 cm 5 cm   Kapandji Opposition Scale (0-10/10) 9/10 4/10 9/10     Wrist 11/24/2020 11/24/2020 12/29/2020   AROM (PROM) Right Left Left   Extension 65 40 70   Flexion 65 25 60   RD 25 20 25   UD 35 35 35     Resisted Testing  Pain Report: - none  + mild    ++ moderate    +++ severe    11/24/2020 12/29/2020   APL + -   EPB + -   EPL + -   FPL + slight -     Strength   (Measured in pounds)  Pain Report: - none  + mild    ++ moderate    +++ severe    12/8/2020 12/8/2020 12/29/2020   Trials Right Left Left   1  2  3 54  52  44 12-  14-  15- 46-  38-  36-   Average 50 14 40  "    Lat Pinch 12/8/2020 12/8/2020 12/29/2020   Trials Right Left Left   1  2  3 16  16  16 7+  9+  10+ 13+  13+  14+   Average 16 9 13     3 Pt Pinch 12/8/2020 12/8/2020 12/29/2020   Trials Right Left Left   1  2  3 12  12  11 5+  5+  5+ 11-  11-  12+   Average 12 5 11     Palpation  Pain Report:  - none    + mild    ++ moderate    +++ severe    11/24/2020 12/29/2020   Radial Styloid - -   1st DC - -   FCR + slight -   Thumb CMC + slight -   A1 pulley thumb + -   Extensor Wad - -   Thenars + -   Thumb MCP + -   Thumb IP + -     Please refer to the daily flowsheet for treatment provided today.     Assessment:  Response to therapy has been improvement to:  ROM of Wrist:  All Planes  Thumb:  All Planes  Strength:   and pinch  Pain:  intensity of pain is decreased    Overall Assessment:  Patient's symptoms are resolving an patient is independent in home exercise program  STG/LTG:  STGoals have been reviewed and progress or achievement has occurred;  see goal sheet for details and updates.  I have re-evaluated this patient and find that the nature, scope, duration and intensity of the therapy is appropriate for the medical condition of the patient.    Plan:  Frequency/Duration:  Discharge from Hand Therapy; continue home program.    Recommendations for Continued Therapy  Home Program:   Warmth for stiffness  Ice after activity for pain  Self TFM to 1st dorsal compartment  Self MFR to EPB/ABL and thenars   AROM of wrist and thumb  Thumb stabilization with hard \"C\" and isometric 1st DI   and pinch strengthening  Thumb isotonics all planes  [place] thumb spica orthosis sleeping and per symptoms day, taper use as tolerated   "

## 2021-01-02 ENCOUNTER — OFFICE VISIT (OUTPATIENT)
Dept: ORTHOPEDICS | Facility: CLINIC | Age: 31
End: 2021-01-02
Payer: OTHER MISCELLANEOUS

## 2021-01-02 VITALS
WEIGHT: 155 LBS | SYSTOLIC BLOOD PRESSURE: 126 MMHG | HEIGHT: 63 IN | BODY MASS INDEX: 27.46 KG/M2 | DIASTOLIC BLOOD PRESSURE: 88 MMHG

## 2021-01-02 DIAGNOSIS — S69.92XD INJURY OF LEFT THUMB, SUBSEQUENT ENCOUNTER: Primary | ICD-10-CM

## 2021-01-02 DIAGNOSIS — S63.642D SPRAIN OF METACARPOPHALANGEAL (MCP) JOINT OF LEFT THUMB, SUBSEQUENT ENCOUNTER: ICD-10-CM

## 2021-01-02 PROCEDURE — 99213 OFFICE O/P EST LOW 20 MIN: CPT | Performed by: PEDIATRICS

## 2021-01-02 ASSESSMENT — MIFFLIN-ST. JEOR: SCORE: 1382.21

## 2021-01-02 NOTE — LETTER
1/2/2021         RE: Marilou Nicolas  [address] [place] [address]        Dear Colleague,    Thank you for referring your patient, Marilou Nicolas, to the [facility]. Please see a copy of my visit note below.    Sports Medicine Clinic Visit    PCP: [facility], [facility]    Marilou Nicolas is a 30 year old female who is seen in f/u up for Injury of left thumb, subsequent encounter. Since last visit on 11/3/2020 patient has had much improvement with her thumb.  She has discontinued the use of her splint due to having some pain while in it when she was advancing her ROM.  She has continued to work within her restrictions.    **  Some short lived pain with some motions or activities, otherwise no pain at rest currently.      Review of Systems  All other systems reviewed and are negative unless noted above.    Past Medical History:   Diagnosis Date     Raynaud disease      Past Surgical History:   Procedure Laterality Date     KIDNEY SURGERY  2016     SURGICAL HISTORY OF -       Bilat myringotomy tubes     Family History   Problem Relation Age of Onset     Cancer Mother         skin cancer     Cancer Paternal Grandmother         skin cancer     Heart Disease Paternal Grandfather         MI     Social History     Socioeconomic History     Marital status:      Spouse name: Not on file     Number of children: Not on file     Years of education: Not on file     Highest education level: Not on file   Occupational History     Not on file   Social Needs     Financial resource strain: Not on file     Food insecurity     Worry: Not on file     Inability: Not on file     Transportation needs     Medical: Not on file     Non-medical: Not on file   Tobacco Use     Smoking status: Never Smoker     Smokeless tobacco: Never Used   Substance and Sexual Activity     Alcohol use: No     Drug use: No     Sexual activity: Not Currently   Lifestyle     Physical  "activity     Days per week: Not on file     Minutes per session: Not on file     Stress: Not on file   Relationships     Social connections     Talks on phone: Not on file     Gets together: Not on file     Attends Church service: Not on file     Active member of club or organization: Not on file     Attends meetings of clubs or organizations: Not on file     Relationship status: Not on file     Intimate partner violence     Fear of current or ex partner: Not on file     Emotionally abused: Not on file     Physically abused: Not on file     Forced sexual activity: Not on file   Other Topics Concern     Parent/sibling w/ CABG, MI or angioplasty before 65F 55M? No   Social History Narrative    ** Merged History Encounter **              Objective  /88   Ht 1.6 m (5' 3\")   Wt 70.3 kg (155 lb)   BMI 27.46 kg/m      GENERAL APPEARANCE: healthy, alert and no distress   GAIT: NORMAL  SKIN: no suspicious lesions or rashes  NEURO: Normal strength and tone, mentation intact and speech normal  PSYCH:  mentation appears normal and affect normal/bright  HEENT: no scleral icterus  CV: distal perfusion intact  RESP: nonlabored breathing      Exam  Left hand/thumb:  No swelling or ecchymosis  Grossly full thumb motion  Stable at thumb MCP joint with stressing collaterals      Radiology  Reviewed previous MRI with pt:    Results for orders placed or performed during the hospital encounter of 11/04/20   MR Finger Left w/o Contrast    Narrative    MR left thumb without contrast 11/4/2020 9:40 AM    Techniques: Multiplanar multisequence imaging of the left hand with  field-of-view centered on the thumb was obtained without  administration of intra-articular or intravenous contrast using  routing protocol.    History: Pain at the first metacarpal phalangeal joint suspect  gamekeeper injury.    Comparison: Radiograph 11/2/2020.    Findings:    The proper ulnar collateral ligament is intact. The accessory ulnar  collateral " ligament is intact. Mild regional soft tissue edema  superficial to the ulnar collateral ligament. There is mild regional  edema primarily at the level of the first metacarpal phalangeal joint,  especially volarly medially. The radial collateral ligament is intact.    Mild intramuscular edema in the opponens pollicis and flexor pollicis  brevis muscles.    Bones: No fracture or abnormal marrow infiltration.    Tendons:  Flexor tendons: Tenosynovial fluid surrounding the flexor hallucis  longus at the level of the distal metacarpal and metacarpal phalangeal  joint. No tear or tendon sheath effusion.  Extensor tendons: Tenosynovial fluid surrounding the extensor pollicis  brevis tendon. Remaining extensor tendons are intact.      Impression    IMPRESSION:    1. The proper and accessory ulnar collateral ligaments of the first  metacarpophalangeal joint are intact with surrounding soft tissue  edema suggestive of mild sprain. There is also mild regional soft  tissue edema around the first metacarpal phalangeal joint and strains  of opponens pollicis and flexor pollicis brevis muscles.    2. Mild tenosynovitis of the flexor pollicis longus and extensor  pollicis brevis tendons.    I have personally reviewed the examination and initial interpretation  and I agree with the findings.    TARUN TORRES MD (Joe)       Assessment:  1. Injury of left thumb, subsequent encounter    2. Sprain of metacarpophalangeal (MCP) joint of left thumb, subsequent encounter        Plan:  Discussed the assessment with the patient.  Improving. Has completed hand therapy.  May use brace prn.  Continue with HEP from therapy.  Advance work; letter provided, gradual increase in work.  Follow up: will leave open ended, but with restrictions expiring in 4 weeks, may recheck around that time, particularly if any ongoing issues.  Questions answered. Discussed signs and symptoms that may indicate more serious issues; the patient was instructed to  seek appropriate care if noted. Marilou indicates understanding of these issues and agrees with the plan.      Michael Montoya DO, CATEVIN        Patient Instructions   Continue home exercises from therapy  Advance work activities; letter updated          This note consists of symbols derived from keyboarding, dictation and/or voice recognition software. As a result, there may be errors in the script that have gone undetected. Please consider this when interpreting information found in this chart.          Again, thank you for allowing me to participate in the care of your patient.        Sincerely,        Michael Montoya DO

## 2021-01-02 NOTE — LETTER
REPORT OF WORK ABILITY    NOTE TO EMPLOYEE: You must promptly provide a copy of this report to your  employer or worker's compensation insurer, and Qualified Rehabilitation Consultant.    Date: January 2, 2021                     Employee Name: Marilou Nicolas         YOB: 1990  Medical Record Number: [id number]   Soc.Sec.No: (Not on file)  Employer: None                Date of Injury: 11/2/20  Managed Care Organization / Insurance Company Name: UNKNOWN    Diagnosis: left thumb injury, sprain and strain  Work Related: yes     MMI: NO  Permanent Partial Disability (PPD) likely: UNKNOWN    EMPLOYEE IS ABLE TO WORK: Return to work with restrictions on next scheduled work date. After 1 month with restrictions below, then would consider cleared for return to work without formal restrictions.     RESTRICTIONS IF ANY:    Lift, carry: up to 5 lbs on left, occasionally (2-4 hours); avoid repetitive lift/carry   After 1 week, then increase to 10 lbs occasionally (2-4 hours)   After an additional week, increase to 15 lbs occasionally (2-4 hours)   After an additional week, increase to 20 lbs occasionally (2-4 hours)  Push/Pull:  up to 5 lbs on left, occasionally (2-4 hours); avoid repetitive push/pull   After 1 week, then increase to 10 lbs occasionally (2-4 hours)   After an additional week, increase to 15 lbs occasionally (2-4 hours)   After an additional week, increase to 20 lbs occasionally (2-4 hours)  Hand/Wrist:  left avoid gripping/grasping, avoid outstretched arms, avoid repetitive motion and avoid operating machine/vibrating tools    OTHER RESTRICTIONS: None    TREATMENT PLAN/NOTES: change work position for comfort, best work height mid chest to mid thigh, may need to restrict work activities for comfort, may use thumb spica wrist brace and cold pack for comfort, Elevate and Rest as needed. Continue with home exercises from hand therapy.            Michael STAPLES

## 2021-01-02 NOTE — PROGRESS NOTES
Sports Medicine Clinic Visit    PCP: Clinic, [facility]    Marilou Nicolas is a 30 year old female who is seen in f/u up for Injury of left thumb, subsequent encounter. Since last visit on 11/3/2020 patient has had much improvement with her thumb.  She has discontinued the use of her splint due to having some pain while in it when she was advancing her ROM.  She has continued to work within her restrictions.    **  Some short lived pain with some motions or activities, otherwise no pain at rest currently.      Review of Systems  All other systems reviewed and are negative unless noted above.    Past Medical History:   Diagnosis Date     Raynaud disease      Past Surgical History:   Procedure Laterality Date     KIDNEY SURGERY  2016     SURGICAL HISTORY OF -       Bilat myringotomy tubes     Family History   Problem Relation Age of Onset     Cancer Mother         skin cancer     Cancer Paternal Grandmother         skin cancer     Heart Disease Paternal Grandfather         MI     Social History     Socioeconomic History     Marital status:      Spouse name: Not on file     Number of children: Not on file     Years of education: Not on file     Highest education level: Not on file   Occupational History     Not on file   Social Needs     Financial resource strain: Not on file     Food insecurity     Worry: Not on file     Inability: Not on file     Transportation needs     Medical: Not on file     Non-medical: Not on file   Tobacco Use     Smoking status: Never Smoker     Smokeless tobacco: Never Used   Substance and Sexual Activity     Alcohol use: No     Drug use: No     Sexual activity: Not Currently   Lifestyle     Physical activity     Days per week: Not on file     Minutes per session: Not on file     Stress: Not on file   Relationships     Social connections     Talks on phone: Not on file     Gets together: Not on file     Attends [employer] service: Not on file     Active member of club or  "organization: Not on file     Attends meetings of clubs or organizations: Not on file     Relationship status: Not on file     Intimate partner violence     Fear of current or ex partner: Not on file     Emotionally abused: Not on file     Physically abused: Not on file     Forced sexual activity: Not on file   Other Topics Concern     Parent/sibling w/ CABG, MI or angioplasty before 65F 55M? No   Social History Narrative    ** Merged History Encounter **              Objective  /88   Ht 1.6 m (5' 3\")   Wt 70.3 kg (155 lb)   BMI 27.46 kg/m      GENERAL APPEARANCE: healthy, alert and no distress   GAIT: NORMAL  SKIN: no suspicious lesions or rashes  NEURO: Normal strength and tone, mentation intact and speech normal  PSYCH:  mentation appears normal and affect normal/bright  HEENT: no scleral icterus  CV: distal perfusion intact  RESP: nonlabored breathing      Exam  Left hand/thumb:  No swelling or ecchymosis  Grossly full thumb motion  Stable at thumb MCP joint with stressing collaterals      Radiology  Reviewed previous MRI with pt:    Results for orders placed or performed during the hospital encounter of 11/04/20   MR Finger Left w/o Contrast    Narrative    MR left thumb without contrast 11/4/2020 9:40 AM    Techniques: Multiplanar multisequence imaging of the left hand with  field-of-view centered on the thumb was obtained without  administration of intra-articular or intravenous contrast using  routing protocol.    History: Pain at the first metacarpal phalangeal joint suspect  gamekeeper injury.    Comparison: Radiograph 11/2/2020.    Findings:    The proper ulnar collateral ligament is intact. The accessory ulnar  collateral ligament is intact. Mild regional soft tissue edema  superficial to the ulnar collateral ligament. There is mild regional  edema primarily at the level of the first metacarpal phalangeal joint,  especially volarly medially. The radial collateral ligament is intact.    Mild " intramuscular edema in the opponens pollicis and flexor pollicis  brevis muscles.    Bones: No fracture or abnormal marrow infiltration.    Tendons:  Flexor tendons: Tenosynovial fluid surrounding the flexor hallucis  longus at the level of the distal metacarpal and metacarpal phalangeal  joint. No tear or tendon sheath effusion.  Extensor tendons: Tenosynovial fluid surrounding the extensor pollicis  brevis tendon. Remaining extensor tendons are intact.      Impression    IMPRESSION:    1. The proper and accessory ulnar collateral ligaments of the first  metacarpophalangeal joint are intact with surrounding soft tissue  edema suggestive of mild sprain. There is also mild regional soft  tissue edema around the first metacarpal phalangeal joint and strains  of opponens pollicis and flexor pollicis brevis muscles.    2. Mild tenosynovitis of the flexor pollicis longus and extensor  pollicis brevis tendons.    I have personally reviewed the examination and initial interpretation  and I agree with the findings.    TARUN TORRES MD (Joe)       Assessment:  1. Injury of left thumb, subsequent encounter    2. Sprain of metacarpophalangeal (MCP) joint of left thumb, subsequent encounter        Plan:  Discussed the assessment with the patient.  Improving. Has completed hand therapy.  May use brace prn.  Continue with HEP from therapy.  Advance work; letter provided, gradual increase in work.  Follow up: will leave open ended, but with restrictions expiring in 4 weeks, may recheck around that time, particularly if any ongoing issues.  Questions answered. Discussed signs and symptoms that may indicate more serious issues; the patient was instructed to seek appropriate care if noted. Marilou indicates understanding of these issues and agrees with the plan.      Michael Montoya DO, CAQ        Patient Instructions   Continue home exercises from therapy  Advance work activities; letter updated          This note consists  of symbols derived from keyboarding, dictation and/or voice recognition software. As a result, there may be errors in the script that have gone undetected. Please consider this when interpreting information found in this chart.

## 2021-01-02 NOTE — LETTER
REPORT OF WORK ABILITY    NOTE TO EMPLOYEE: You must promptly provide a copy of this report to your  employer or worker's compensation insurer, and Qualified Rehabilitation Consultant.    Date: January 2, 2021                     Employee Name: Marilou Nicolas         YOB: 1990  Medical Record Number: [id number]   Soc.Sec.No: (Not on file)  Employer: None                Date of Injury: 11/2/20  Managed Care Organization / Insurance Company Name: UNKNOWN    Diagnosis: left thumb injury, sprain and strain  Work Related: yes     MMI: NO  Permanent Partial Disability (PPD) likely: UNKNOWN    EMPLOYEE IS ABLE TO WORK: Return to work with restrictions on next scheduled work date. After 1 month with restrictions below, then would consider cleared for return to work without formal restrictions.     RESTRICTIONS IF ANY:  Lift, carry: up to 5 lbs on left, occasionally (2-4 hours); avoid repetitive lift/carry   After 1 week, then increase to 10 lbs occasionally (2-4 hours)   After an additional week, increase to 15 lbs occasionally (2-4 hours)   After an additional week, increase to 20 lbs occasionally (2-4 hours)  Push/Pull:  up to 5 lbs on left, occasionally (2-4 hours); avoid repetitive push/pull   After 1 week, then increase to 10 lbs occasionally (2-4 hours)   After an additional week, increase to 15 lbs occasionally (2-4 hours)   After an additional week, increase to 20 lbs occasionally (2-4 hours)  Hand/Wrist:  left avoid gripping/grasping, avoid outstretched arms, avoid repetitive motion and avoid operating machine/vibrating tools    OTHER RESTRICTIONS: None    TREATMENT PLAN/NOTES: change work position for comfort, best work height mid chest to mid thigh, may need to restrict work activities for comfort, may use thumb spica wrist brace and cold pack for comfort, Elevate and Rest as needed. Continue with home exercises from hand therapy.            Michael STAPLES

## 2021-09-18 ENCOUNTER — HEALTH MAINTENANCE LETTER (OUTPATIENT)
Age: 31
End: 2021-09-18

## 2022-01-08 ENCOUNTER — HEALTH MAINTENANCE LETTER (OUTPATIENT)
Age: 32
End: 2022-01-08

## 2022-08-15 ENCOUNTER — LAB REQUISITION (OUTPATIENT)
Dept: LAB | Facility: CLINIC | Age: 32
End: 2022-08-15
Payer: MEDICAID

## 2022-08-15 DIAGNOSIS — Z36.89 ENCOUNTER FOR OTHER SPECIFIED ANTENATAL SCREENING: ICD-10-CM

## 2022-08-15 LAB
ABO/RH(D): NORMAL
AMPHETAMINES UR QL SCN: NORMAL
ANION GAP SERPL CALCULATED.3IONS-SCNC: 13 MMOL/L (ref 7–15)
ANTIBODY SCREEN: NEGATIVE
BARBITURATES UR QL SCN: NORMAL
BASOPHILS # BLD AUTO: 0 10E3/UL (ref 0–0.2)
BASOPHILS NFR BLD AUTO: 0 %
BENZODIAZ UR QL SCN: NORMAL
BUN SERPL-MCNC: 7.3 MG/DL (ref 6–20)
BZE UR QL SCN: NORMAL
CALCIUM SERPL-MCNC: 9.6 MG/DL (ref 8.6–10)
CANNABINOIDS UR QL SCN: NORMAL
CHLORIDE SERPL-SCNC: 101 MMOL/L (ref 98–107)
CREAT SERPL-MCNC: 0.62 MG/DL (ref 0.51–0.95)
DEPRECATED HCO3 PLAS-SCNC: 24 MMOL/L (ref 22–29)
EOSINOPHIL # BLD AUTO: 0.1 10E3/UL (ref 0–0.7)
EOSINOPHIL NFR BLD AUTO: 1 %
ERYTHROCYTE [DISTWIDTH] IN BLOOD BY AUTOMATED COUNT: 13.1 % (ref 10–15)
GFR SERPL CREATININE-BSD FRML MDRD: >90 ML/MIN/1.73M2
GLUCOSE SERPL-MCNC: 82 MG/DL (ref 70–99)
HBA1C MFR BLD: 5.1 %
HCT VFR BLD AUTO: 38.9 % (ref 35–47)
HGB BLD-MCNC: 12.7 G/DL (ref 11.7–15.7)
IMM GRANULOCYTES # BLD: 0.2 10E3/UL
IMM GRANULOCYTES NFR BLD: 1 %
LYMPHOCYTES # BLD AUTO: 2.1 10E3/UL (ref 0.8–5.3)
LYMPHOCYTES NFR BLD AUTO: 16 %
MCH RBC QN AUTO: 29.7 PG (ref 26.5–33)
MCHC RBC AUTO-ENTMCNC: 32.6 G/DL (ref 31.5–36.5)
MCV RBC AUTO: 91 FL (ref 78–100)
MONOCYTES # BLD AUTO: 0.9 10E3/UL (ref 0–1.3)
MONOCYTES NFR BLD AUTO: 7 %
NEUTROPHILS # BLD AUTO: 9.5 10E3/UL (ref 1.6–8.3)
NEUTROPHILS NFR BLD AUTO: 75 %
NRBC # BLD AUTO: 0 10E3/UL
NRBC BLD AUTO-RTO: 0 /100
OPIATES UR QL SCN: NORMAL
PCP QUAL URINE (ROCHE): NORMAL
PLATELET # BLD AUTO: 461 10E3/UL (ref 150–450)
POTASSIUM SERPL-SCNC: 3.9 MMOL/L (ref 3.4–5.3)
RBC # BLD AUTO: 4.28 10E6/UL (ref 3.8–5.2)
SODIUM SERPL-SCNC: 138 MMOL/L (ref 136–145)
SPECIMEN EXPIRATION DATE: NORMAL
WBC # BLD AUTO: 12.7 10E3/UL (ref 4–11)

## 2022-08-15 PROCEDURE — 86901 BLOOD TYPING SEROLOGIC RH(D): CPT | Performed by: ADVANCED PRACTICE MIDWIFE

## 2022-08-15 PROCEDURE — 80307 DRUG TEST PRSMV CHEM ANLYZR: CPT | Mod: ORL | Performed by: ADVANCED PRACTICE MIDWIFE

## 2022-08-15 PROCEDURE — 80048 BASIC METABOLIC PNL TOTAL CA: CPT | Mod: ORL | Performed by: ADVANCED PRACTICE MIDWIFE

## 2022-08-15 PROCEDURE — 80081 OBSTETRIC PANEL INC HIV TSTG: CPT | Mod: ORL | Performed by: ADVANCED PRACTICE MIDWIFE

## 2022-08-15 PROCEDURE — 86592 SYPHILIS TEST NON-TREP QUAL: CPT | Performed by: ADVANCED PRACTICE MIDWIFE

## 2022-08-15 PROCEDURE — 85025 COMPLETE CBC W/AUTO DIFF WBC: CPT | Performed by: ADVANCED PRACTICE MIDWIFE

## 2022-08-15 PROCEDURE — 87389 HIV-1 AG W/HIV-1&-2 AB AG IA: CPT | Performed by: ADVANCED PRACTICE MIDWIFE

## 2022-08-15 PROCEDURE — 87340 HEPATITIS B SURFACE AG IA: CPT | Performed by: ADVANCED PRACTICE MIDWIFE

## 2022-08-15 PROCEDURE — 86803 HEPATITIS C AB TEST: CPT | Performed by: ADVANCED PRACTICE MIDWIFE

## 2022-08-15 PROCEDURE — 86850 RBC ANTIBODY SCREEN: CPT | Performed by: ADVANCED PRACTICE MIDWIFE

## 2022-08-15 PROCEDURE — 87491 CHLMYD TRACH DNA AMP PROBE: CPT | Mod: ORL | Performed by: ADVANCED PRACTICE MIDWIFE

## 2022-08-15 PROCEDURE — 83036 HEMOGLOBIN GLYCOSYLATED A1C: CPT | Mod: ORL | Performed by: ADVANCED PRACTICE MIDWIFE

## 2022-08-15 PROCEDURE — 87086 URINE CULTURE/COLONY COUNT: CPT | Mod: ORL | Performed by: ADVANCED PRACTICE MIDWIFE

## 2022-08-16 ENCOUNTER — LAB REQUISITION (OUTPATIENT)
Dept: LAB | Facility: CLINIC | Age: 32
End: 2022-08-16
Payer: MEDICAID

## 2022-08-16 DIAGNOSIS — Z36.89 ENCOUNTER FOR OTHER SPECIFIED ANTENATAL SCREENING: ICD-10-CM

## 2022-08-16 LAB
HBV SURFACE AG SERPL QL IA: NONREACTIVE
HCV AB SERPL QL IA: NONREACTIVE
HIV 1+2 AB+HIV1 P24 AG SERPL QL IA: NONREACTIVE
RPR SER QL: NONREACTIVE
RUBV IGG SERPL QL IA: 1.18 INDEX
RUBV IGG SERPL QL IA: POSITIVE

## 2022-08-17 LAB
BACTERIA UR CULT: NO GROWTH
C TRACH DNA SPEC QL PROBE+SIG AMP: NEGATIVE
N GONORRHOEA DNA SPEC QL NAA+PROBE: NEGATIVE

## 2022-10-03 ENCOUNTER — LAB REQUISITION (OUTPATIENT)
Dept: LAB | Facility: CLINIC | Age: 32
End: 2022-10-03
Payer: MEDICAID

## 2022-10-03 DIAGNOSIS — R03.0 ELEVATED BLOOD-PRESSURE READING, WITHOUT DIAGNOSIS OF HYPERTENSION: ICD-10-CM

## 2022-10-03 DIAGNOSIS — Z36.87 ENCOUNTER FOR ANTENATAL SCREENING FOR UNCERTAIN DATES: ICD-10-CM

## 2022-10-03 PROCEDURE — 82728 ASSAY OF FERRITIN: CPT | Mod: ORL | Performed by: ADVANCED PRACTICE MIDWIFE

## 2022-10-03 PROCEDURE — 84156 ASSAY OF PROTEIN URINE: CPT | Performed by: ADVANCED PRACTICE MIDWIFE

## 2022-10-03 PROCEDURE — 85027 COMPLETE CBC AUTOMATED: CPT | Performed by: ADVANCED PRACTICE MIDWIFE

## 2022-10-03 PROCEDURE — 84460 ALANINE AMINO (ALT) (SGPT): CPT | Mod: ORL | Performed by: ADVANCED PRACTICE MIDWIFE

## 2022-10-03 PROCEDURE — 82565 ASSAY OF CREATININE: CPT | Mod: ORL | Performed by: ADVANCED PRACTICE MIDWIFE

## 2022-10-03 PROCEDURE — 84450 TRANSFERASE (AST) (SGOT): CPT | Performed by: ADVANCED PRACTICE MIDWIFE

## 2022-10-04 LAB
ALBUMIN MFR UR ELPH: 7 MG/DL
ALT SERPL W P-5'-P-CCNC: 7 U/L (ref 10–35)
AST SERPL W P-5'-P-CCNC: 17 U/L (ref 10–35)
CREAT SERPL-MCNC: 0.63 MG/DL (ref 0.51–0.95)
CREAT UR-MCNC: 41.1 MG/DL
ERYTHROCYTE [DISTWIDTH] IN BLOOD BY AUTOMATED COUNT: 12.4 % (ref 10–15)
FERRITIN SERPL-MCNC: 13 NG/ML (ref 6–175)
GFR SERPL CREATININE-BSD FRML MDRD: >90 ML/MIN/1.73M2
HCT VFR BLD AUTO: 36.3 % (ref 35–47)
HGB BLD-MCNC: 11.9 G/DL (ref 11.7–15.7)
MCH RBC QN AUTO: 30.1 PG (ref 26.5–33)
MCHC RBC AUTO-ENTMCNC: 32.8 G/DL (ref 31.5–36.5)
MCV RBC AUTO: 92 FL (ref 78–100)
PLATELET # BLD AUTO: 440 10E3/UL (ref 150–450)
PROT/CREAT 24H UR: 0.17 MG/MG CR (ref 0–0.2)
RBC # BLD AUTO: 3.95 10E6/UL (ref 3.8–5.2)
WBC # BLD AUTO: 19.6 10E3/UL (ref 4–11)

## 2022-11-19 ENCOUNTER — HEALTH MAINTENANCE LETTER (OUTPATIENT)
Age: 32
End: 2022-11-19

## 2023-01-10 ENCOUNTER — LAB REQUISITION (OUTPATIENT)
Dept: LAB | Facility: CLINIC | Age: 33
End: 2023-01-10
Payer: MEDICAID

## 2023-01-10 PROCEDURE — 87624 HPV HI-RISK TYP POOLED RSLT: CPT | Mod: ORL | Performed by: OBSTETRICS & GYNECOLOGY

## 2023-01-10 PROCEDURE — G0145 SCR C/V CYTO,THINLAYER,RESCR: HCPCS | Mod: ORL | Performed by: OBSTETRICS & GYNECOLOGY

## 2023-01-10 PROCEDURE — G0124 SCREEN C/V THIN LAYER BY MD: HCPCS | Performed by: PATHOLOGY

## 2023-01-16 LAB
BKR LAB AP GYN ADEQUACY: ABNORMAL
BKR LAB AP GYN INTERPRETATION: ABNORMAL
BKR LAB AP HPV REFLEX: ABNORMAL
BKR LAB AP LMP: ABNORMAL
BKR LAB AP PREVIOUS ABNL DX: ABNORMAL
BKR LAB AP PREVIOUS ABNORMAL: ABNORMAL
PATH REPORT.COMMENTS IMP SPEC: ABNORMAL
PATH REPORT.COMMENTS IMP SPEC: ABNORMAL
PATH REPORT.RELEVANT HX SPEC: ABNORMAL

## 2023-01-16 PROCEDURE — 88141 CYTOPATH C/V INTERPRET: CPT | Performed by: PATHOLOGY

## 2023-01-18 LAB
HUMAN PAPILLOMA VIRUS 16 DNA: NEGATIVE
HUMAN PAPILLOMA VIRUS 18 DNA: NEGATIVE
HUMAN PAPILLOMA VIRUS FINAL DIAGNOSIS: NORMAL
HUMAN PAPILLOMA VIRUS OTHER HR: NEGATIVE

## 2023-04-09 ENCOUNTER — HEALTH MAINTENANCE LETTER (OUTPATIENT)
Age: 33
End: 2023-04-09

## 2023-07-14 ENCOUNTER — APPOINTMENT (OUTPATIENT)
Dept: GENERAL RADIOLOGY | Facility: CLINIC | Age: 33
End: 2023-07-14
Attending: EMERGENCY MEDICINE
Payer: COMMERCIAL

## 2023-07-14 ENCOUNTER — HOSPITAL ENCOUNTER (EMERGENCY)
Facility: CLINIC | Age: 33
Discharge: HOME | End: 2023-07-14
Attending: EMERGENCY MEDICINE | Admitting: EMERGENCY MEDICINE
Payer: COMMERCIAL

## 2023-07-14 VITALS
OXYGEN SATURATION: 100 % | SYSTOLIC BLOOD PRESSURE: 162 MMHG | HEART RATE: 63 BPM | DIASTOLIC BLOOD PRESSURE: 90 MMHG | RESPIRATION RATE: 20 BRPM | TEMPERATURE: 97.7 F

## 2023-07-14 DIAGNOSIS — S93.402A SPRAIN OF LEFT ANKLE, UNSPECIFIED LIGAMENT, INITIAL ENCOUNTER: ICD-10-CM

## 2023-07-14 PROCEDURE — 99283 EMERGENCY DEPT VISIT LOW MDM: CPT | Performed by: EMERGENCY MEDICINE

## 2023-07-14 PROCEDURE — 73610 X-RAY EXAM OF ANKLE: CPT | Mod: LT

## 2023-07-14 PROCEDURE — 73630 X-RAY EXAM OF FOOT: CPT | Mod: LT

## 2023-07-14 ASSESSMENT — ACTIVITIES OF DAILY LIVING (ADL): ADLS_ACUITY_SCORE: 33

## 2023-07-14 NOTE — ED PROVIDER NOTES
History     Chief Complaint   Patient presents with    Ankle Pain     HPI  Marilou Nicolas is a 33 year old female who presents to the emergency department after the patient slipped  when stepping off of a curb, causing inversion of the left foot, with pain over the lateral aspect of the left ankle.  Able to walk, however gingerly over the next number of hours.  No injury elsewhere.  No prior ankle injury, or surgeries.    Allergies:  Allergies   Allergen Reactions    Morphine And Related      vomiting    Milk Products     Pcn [Penicillins] Other (See Comments)    Penicillin [Penicillins]      Rash      Sudafed [Acetaminophen]        Problem List:    Patient Active Problem List    Diagnosis Date Noted    CARDIOVASCULAR SCREENING; LDL GOAL LESS THAN 160 10/31/2010     Priority: Medium    Sensory disorder of eyelid 03/31/2008     Priority: Medium     Problem list name updated by automated process. Provider to review      Pain in limb 03/31/2008     Priority: Medium    Raynaud's syndrome 12/27/2006     Priority: Medium        Past Medical History:    Past Medical History:   Diagnosis Date    Raynaud disease        Past Surgical History:    Past Surgical History:   Procedure Laterality Date    KIDNEY SURGERY  2016    SURGICAL HISTORY OF -       Bilat myringotomy tubes       Family History:    Family History   Problem Relation Age of Onset    Cancer Mother         skin cancer    Cancer Paternal Grandmother         skin cancer    Heart Disease Paternal Grandfather         MI       Social History:  Marital Status:   [2]  Social History     Tobacco Use    Smoking status: Never    Smokeless tobacco: Never   Substance Use Topics    Alcohol use: No    Drug use: No        Medications:    norethindrone (MICRONOR) 0.35 MG tablet  oxyCODONE (ROXICODONE) 5 MG tablet          Review of Systems  See HPI  Physical Exam   BP: (!) 162/90  Pulse: 63  Temp: 97.7  F (36.5  C)  Resp: 20  SpO2: 100 %      Physical Exam  BP (!)  162/90   Pulse 63   Temp 97.7  F (36.5  C)   Resp 20   SpO2 100%   General: alert and in no acute distress  Head: atraumatic, normocephalic  Abd: nondistended  Musculoskel/Extremities: Tenderness over the lateral aspect of the left ankle, in addition to lateral left foot  Skin: no rashes, no diaphoresis and skin color normal  Neuro: Patient awake, alert, oriented, speech is fluent,    Psychiatric: affect/mood normal, cooperative, normal judgement/insight and memory intact    ED Course                 Procedures              Critical Care time:  none               Results for orders placed or performed during the hospital encounter of 07/14/23 (from the past 24 hour(s))   Foot XR, G/E 3 views, left    Narrative    EXAM: XR ANKLE LEFT G/E 3 VIEWS, XR FOOT LEFT G/E 3 VIEWS  LOCATION: [facility]  DATE: 7/14/2023    INDICATION: Pain after injury.  COMPARISON: None.      Impression    IMPRESSION: Normal joint spaces and alignment. No fracture.   XR Ankle Left G/E 3 Views    Narrative    EXAM: XR ANKLE LEFT G/E 3 VIEWS, XR FOOT LEFT G/E 3 VIEWS  LOCATION: [facility]  DATE: 7/14/2023    INDICATION: Pain after injury.  COMPARISON: None.      Impression    IMPRESSION: Normal joint spaces and alignment. No fracture.       Medications - No data to display    Assessments & Plan (with Medical Decision Making)  33 year old female presenting with left ankle injury after patient slipped, falling off of a curb, twisting her left ankle.  X-ray performed, reviewed by myself in addition to radiology interpretation showing no evidence of acute fracture.  Patient has her own brace in place.  She will be provided crutches.  Weightbearing as tolerated.  Follow-up in clinic as needed.  Return instructions discussed.     I have reviewed the nursing notes.    I have reviewed the findings, diagnosis, plan and need for follow up with the patient.               New Prescriptions    No  medications on file       Final diagnoses:   Sprain of left ankle, unspecified ligament, initial encounter       7/14/2023   [facility] EMERGENCY DEPT       VicenteTheodore garcia MD  07/14/23 0580

## 2023-07-14 NOTE — ED TRIAGE NOTES
Pt rolled her left ankle on a curb and/or a rock at 1900 last night and has been having increasing pain. Pt applied a brace and has been elevating it. Last ibuprofen at 2000. Able to bear weight.      Triage Assessment     Row Name 07/14/23 0458       Triage Assessment (Adult)    Airway WDL WDL       Respiratory WDL    Respiratory WDL WDL       Skin Circulation/Temperature WDL    Skin Circulation/Temperature WDL WDL       Cardiac WDL    Cardiac WDL WDL       Peripheral/Neurovascular WDL    Peripheral Neurovascular WDL WDL       Cognitive/Neuro/Behavioral WDL    Cognitive/Neuro/Behavioral WDL WDL

## 2023-08-13 ENCOUNTER — HOSPITAL ENCOUNTER (EMERGENCY)
Facility: CLINIC | Age: 33
Discharge: HOME | End: 2023-08-13
Attending: NURSE PRACTITIONER | Admitting: NURSE PRACTITIONER
Payer: COMMERCIAL

## 2023-08-13 ENCOUNTER — APPOINTMENT (OUTPATIENT)
Dept: CT IMAGING | Facility: CLINIC | Age: 33
End: 2023-08-13
Attending: NURSE PRACTITIONER
Payer: COMMERCIAL

## 2023-08-13 ENCOUNTER — NURSE TRIAGE (OUTPATIENT)
Dept: NURSING | Facility: CLINIC | Age: 33
End: 2023-08-13
Payer: COMMERCIAL

## 2023-08-13 VITALS
SYSTOLIC BLOOD PRESSURE: 139 MMHG | DIASTOLIC BLOOD PRESSURE: 86 MMHG | OXYGEN SATURATION: 99 % | HEIGHT: 63 IN | HEART RATE: 52 BPM | RESPIRATION RATE: 11 BRPM | WEIGHT: 203 LBS | TEMPERATURE: 97.5 F | BODY MASS INDEX: 35.97 KG/M2

## 2023-08-13 DIAGNOSIS — M54.6 ACUTE MIDLINE THORACIC BACK PAIN: ICD-10-CM

## 2023-08-13 DIAGNOSIS — R07.9 CHEST PAIN, UNSPECIFIED TYPE: ICD-10-CM

## 2023-08-13 LAB
ALBUMIN SERPL BCG-MCNC: 4.2 G/DL (ref 3.5–5.2)
ALP SERPL-CCNC: 135 U/L (ref 35–104)
ALT SERPL W P-5'-P-CCNC: 95 U/L (ref 0–50)
ANION GAP SERPL CALCULATED.3IONS-SCNC: 11 MMOL/L (ref 7–15)
AST SERPL W P-5'-P-CCNC: 167 U/L (ref 0–45)
BASOPHILS # BLD AUTO: 0 10E3/UL (ref 0–0.2)
BASOPHILS NFR BLD AUTO: 0 %
BILIRUB SERPL-MCNC: 1 MG/DL
BUN SERPL-MCNC: 8 MG/DL (ref 6–20)
CALCIUM SERPL-MCNC: 9.2 MG/DL (ref 8.6–10)
CHLORIDE SERPL-SCNC: 101 MMOL/L (ref 98–107)
CREAT SERPL-MCNC: 0.83 MG/DL (ref 0.51–0.95)
D DIMER PPP FEU-MCNC: 0.83 UG/ML FEU (ref 0–0.5)
DEPRECATED HCO3 PLAS-SCNC: 26 MMOL/L (ref 22–29)
EOSINOPHIL # BLD AUTO: 0 10E3/UL (ref 0–0.7)
EOSINOPHIL NFR BLD AUTO: 0 %
ERYTHROCYTE [DISTWIDTH] IN BLOOD BY AUTOMATED COUNT: 15.6 % (ref 10–15)
GFR SERPL CREATININE-BSD FRML MDRD: >90 ML/MIN/1.73M2
GLUCOSE SERPL-MCNC: 114 MG/DL (ref 70–99)
HCT VFR BLD AUTO: 39 % (ref 35–47)
HGB BLD-MCNC: 12.5 G/DL (ref 11.7–15.7)
HOLD SPECIMEN: NORMAL
IMM GRANULOCYTES # BLD: 0 10E3/UL
IMM GRANULOCYTES NFR BLD: 0 %
LIPASE SERPL-CCNC: 27 U/L (ref 13–60)
LYMPHOCYTES # BLD AUTO: 1.6 10E3/UL (ref 0.8–5.3)
LYMPHOCYTES NFR BLD AUTO: 22 %
MCH RBC QN AUTO: 25.2 PG (ref 26.5–33)
MCHC RBC AUTO-ENTMCNC: 32.1 G/DL (ref 31.5–36.5)
MCV RBC AUTO: 79 FL (ref 78–100)
MONOCYTES # BLD AUTO: 0.6 10E3/UL (ref 0–1.3)
MONOCYTES NFR BLD AUTO: 8 %
NEUTROPHILS # BLD AUTO: 5.1 10E3/UL (ref 1.6–8.3)
NEUTROPHILS NFR BLD AUTO: 70 %
NRBC # BLD AUTO: 0 10E3/UL
NRBC BLD AUTO-RTO: 0 /100
PLATELET # BLD AUTO: 395 10E3/UL (ref 150–450)
POTASSIUM SERPL-SCNC: 3.7 MMOL/L (ref 3.4–5.3)
PROT SERPL-MCNC: 7.3 G/DL (ref 6.4–8.3)
RBC # BLD AUTO: 4.96 10E6/UL (ref 3.8–5.2)
SODIUM SERPL-SCNC: 138 MMOL/L (ref 136–145)
TROPONIN T SERPL HS-MCNC: 13 NG/L
TROPONIN T SERPL HS-MCNC: <6 NG/L
WBC # BLD AUTO: 7.3 10E3/UL (ref 4–11)

## 2023-08-13 PROCEDURE — 96361 HYDRATE IV INFUSION ADD-ON: CPT

## 2023-08-13 PROCEDURE — 80053 COMPREHEN METABOLIC PANEL: CPT | Performed by: NURSE PRACTITIONER

## 2023-08-13 PROCEDURE — 93005 ELECTROCARDIOGRAM TRACING: CPT | Mod: 76

## 2023-08-13 PROCEDURE — 85025 COMPLETE CBC W/AUTO DIFF WBC: CPT | Performed by: NURSE PRACTITIONER

## 2023-08-13 PROCEDURE — 93010 ELECTROCARDIOGRAM REPORT: CPT | Performed by: NURSE PRACTITIONER

## 2023-08-13 PROCEDURE — 96374 THER/PROPH/DIAG INJ IV PUSH: CPT | Mod: 59

## 2023-08-13 PROCEDURE — 93005 ELECTROCARDIOGRAM TRACING: CPT

## 2023-08-13 PROCEDURE — 96375 TX/PRO/DX INJ NEW DRUG ADDON: CPT | Mod: 59

## 2023-08-13 PROCEDURE — 99285 EMERGENCY DEPT VISIT HI MDM: CPT | Mod: 25

## 2023-08-13 PROCEDURE — 99285 EMERGENCY DEPT VISIT HI MDM: CPT | Mod: 25 | Performed by: NURSE PRACTITIONER

## 2023-08-13 PROCEDURE — 71275 CT ANGIOGRAPHY CHEST: CPT

## 2023-08-13 PROCEDURE — 36415 COLL VENOUS BLD VENIPUNCTURE: CPT | Performed by: NURSE PRACTITIONER

## 2023-08-13 PROCEDURE — 258N000003 HC RX IP 258 OP 636: Performed by: NURSE PRACTITIONER

## 2023-08-13 PROCEDURE — 84484 ASSAY OF TROPONIN QUANT: CPT | Performed by: NURSE PRACTITIONER

## 2023-08-13 PROCEDURE — 250N000011 HC RX IP 250 OP 636: Performed by: NURSE PRACTITIONER

## 2023-08-13 PROCEDURE — 83690 ASSAY OF LIPASE: CPT | Performed by: NURSE PRACTITIONER

## 2023-08-13 PROCEDURE — 96376 TX/PRO/DX INJ SAME DRUG ADON: CPT

## 2023-08-13 PROCEDURE — 250N000009 HC RX 250: Performed by: NURSE PRACTITIONER

## 2023-08-13 PROCEDURE — 85379 FIBRIN DEGRADATION QUANT: CPT | Performed by: NURSE PRACTITIONER

## 2023-08-13 PROCEDURE — 250N000011 HC RX IP 250 OP 636: Mod: JZ | Performed by: NURSE PRACTITIONER

## 2023-08-13 RX ORDER — ONDANSETRON 2 MG/ML
4 INJECTION INTRAMUSCULAR; INTRAVENOUS EVERY 30 MIN PRN
Status: DISCONTINUED | OUTPATIENT
Start: 2023-08-13 | End: 2023-08-14 | Stop reason: HOSPADM

## 2023-08-13 RX ORDER — IOPAMIDOL 755 MG/ML
85 INJECTION, SOLUTION INTRAVASCULAR ONCE
Status: COMPLETED | OUTPATIENT
Start: 2023-08-13 | End: 2023-08-13

## 2023-08-13 RX ORDER — CYCLOBENZAPRINE HCL 10 MG
10 TABLET ORAL 3 TIMES DAILY PRN
Qty: 15 TABLET | Refills: 0 | Status: SHIPPED | OUTPATIENT
Start: 2023-08-13

## 2023-08-13 RX ORDER — OXYCODONE HYDROCHLORIDE 5 MG/1
5 TABLET ORAL EVERY 6 HOURS PRN
Qty: 6 TABLET | Refills: 0 | Status: SHIPPED | OUTPATIENT
Start: 2023-08-13

## 2023-08-13 RX ORDER — HYDROMORPHONE HYDROCHLORIDE 1 MG/ML
0.5 INJECTION, SOLUTION INTRAMUSCULAR; INTRAVENOUS; SUBCUTANEOUS EVERY 30 MIN PRN
Status: COMPLETED | OUTPATIENT
Start: 2023-08-13 | End: 2023-08-13

## 2023-08-13 RX ADMIN — HYDROMORPHONE HYDROCHLORIDE 0.5 MG: 1 INJECTION, SOLUTION INTRAMUSCULAR; INTRAVENOUS; SUBCUTANEOUS at 18:58

## 2023-08-13 RX ADMIN — ONDANSETRON 4 MG: 2 INJECTION INTRAMUSCULAR; INTRAVENOUS at 21:57

## 2023-08-13 RX ADMIN — HYDROMORPHONE HYDROCHLORIDE 0.5 MG: 1 INJECTION, SOLUTION INTRAMUSCULAR; INTRAVENOUS; SUBCUTANEOUS at 20:09

## 2023-08-13 RX ADMIN — IOPAMIDOL 85 ML: 755 INJECTION, SOLUTION INTRAVENOUS at 21:02

## 2023-08-13 RX ADMIN — ONDANSETRON 4 MG: 2 INJECTION INTRAMUSCULAR; INTRAVENOUS at 18:58

## 2023-08-13 RX ADMIN — HYDROMORPHONE HYDROCHLORIDE 0.5 MG: 1 INJECTION, SOLUTION INTRAMUSCULAR; INTRAVENOUS; SUBCUTANEOUS at 21:57

## 2023-08-13 RX ADMIN — SODIUM CHLORIDE 100 ML: 9 INJECTION, SOLUTION INTRAVENOUS at 21:02

## 2023-08-13 RX ADMIN — SODIUM CHLORIDE 1000 ML: 9 INJECTION, SOLUTION INTRAVENOUS at 18:57

## 2023-08-13 ASSESSMENT — ACTIVITIES OF DAILY LIVING (ADL)
ADLS_ACUITY_SCORE: 35
ADLS_ACUITY_SCORE: 35
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
## 2023-08-13 NOTE — ED TRIAGE NOTES
Pain that is constant and preventing her from sleeping and daily activities     Triage Assessment       Row Name 08/13/23 7582       Triage Assessment (Adult)    Airway WDL WDL       Respiratory WDL    Respiratory WDL WDL       Skin Circulation/Temperature WDL    Skin Circulation/Temperature WDL WDL       Cardiac WDL    Cardiac WDL WDL       Peripheral/Neurovascular WDL    Peripheral Neurovascular WDL WDL       Cognitive/Neuro/Behavioral WDL    Cognitive/Neuro/Behavioral WDL WDL

## 2023-08-13 NOTE — ED NOTES
Pt has pain in the center of her back  and in her sternum that started this morning. States she is always in some sort of pain, but is usually able to function. Works overnights and has been unable to sleep today d/t pain. Has tried a hot shower, biofreeze, tylenol and ibuprofen without any relief. No known injury or muscle strain.

## 2023-08-13 NOTE — ED PROVIDER NOTES
History     Chief Complaint   Patient presents with    pain in back and in sternal bone     HPI  Marilou Nicolas is a 33 year old female who presents for evaluation of mid back pain.  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  Her pain started last night while she was working.  Once she got home the pain seemed to gradually worsen and she was unable to sleep all day today.  Pain radiates from her mid back and wraps around her ribs to her chest.  Nauseated, but no vomiting.  Denies heartburn.  Denies fever or chills.  Denies shortness of breath.  Denies constipation or diarrhea.  Denies urinary symptoms.   She does do some repetitive reaching and lifting at work, but does not endorse any injury or anything that triggered her pain.  She is treating her pain with Tylenol and ibuprofen without relief.  She tried taking a hot shower and Biofreeze without relief.    Allergies:  Allergies   Allergen Reactions    Morphine And Related      vomiting    Milk Products     Pcn [Penicillins] Other (See Comments)    Penicillin [Penicillins]      Rash      Sudafed [Acetaminophen]        Problem List:    Patient Active Problem List    Diagnosis Date Noted    CARDIOVASCULAR SCREENING; LDL GOAL LESS THAN 160 10/31/2010     Priority: Medium    Sensory disorder of eyelid 03/31/2008     Priority: Medium     Problem list name updated by automated process. Provider to review      Pain in limb 03/31/2008     Priority: Medium    Raynaud's syndrome 12/27/2006     Priority: Medium        Past Medical History:    Past Medical History:   Diagnosis Date    Raynaud disease        Past Surgical History:    Past Surgical History:   Procedure Laterality Date    KIDNEY SURGERY  2016    SURGICAL HISTORY OF -       Bilat myringotomy tubes       Family History:    Family History   Problem Relation Age of Onset    Cancer Mother         skin cancer    Cancer Paternal Grandmother         skin cancer    Heart Disease Paternal Grandfather         MI  "      Social History:  Marital Status:   [2]  Social History     Tobacco Use    Smoking status: Never    Smokeless tobacco: Never   Substance Use Topics    Alcohol use: No    Drug use: No        Medications:    cyclobenzaprine (FLEXERIL) 10 MG tablet  oxyCODONE (ROXICODONE) 5 MG tablet  norethindrone (MICRONOR) 0.35 MG tablet          Review of Systems  As mentioned above in the history present illness. All other systems were reviewed and are negative.    Physical Exam   BP: (!) 139/91  Pulse: 91  Temp: 97.5  F (36.4  C)  Resp: 22  Height: 160 cm (5' 3\")  Weight: 92.1 kg (203 lb)  SpO2: 100 %      Physical Exam  Constitutional:       General: She is not in acute distress.     Appearance: Normal appearance. She is well-developed. She is not ill-appearing.   HENT:      Head: Normocephalic and atraumatic.      Right Ear: External ear normal.      Left Ear: External ear normal.      Nose: Nose normal.      Mouth/Throat:      Mouth: Mucous membranes are moist.   Eyes:      Conjunctiva/sclera: Conjunctivae normal.   Cardiovascular:      Rate and Rhythm: Normal rate and regular rhythm.      Heart sounds: Normal heart sounds. No murmur heard.  Pulmonary:      Effort: Pulmonary effort is normal. No respiratory distress.      Breath sounds: Normal breath sounds.   Abdominal:      General: Bowel sounds are normal. There is no distension.      Palpations: Abdomen is soft.      Tenderness: There is no abdominal tenderness.   Musculoskeletal:         General: Normal range of motion.      Thoracic back: Tenderness (diffuse mid-upper back tenderness with minimal palpation. Patient not able to tolerate light touch of the skin in the upper back/pain out of proportion.) present.      Lumbar back: Normal.        Back:    Skin:     General: Skin is warm and dry.      Findings: No erythema or rash.   Neurological:      General: No focal deficit present.      Mental Status: She is alert and oriented to person, place, and time. "         ED Course               EKG Interpretation: #1     Interpreted by Dr. Callaway and MIGUEL Cueto CNP  Time reviewed: 6:28 pm  Symptoms at time of EKG: mid back and chest pain   Rhythm: Normal sinus   Rate: Normal  Axis: Normal  Ectopy: None  Conduction: Normal  ST Segments/ T Waves: questionable ST elevation isolated to V2 No acute ischemic changes  Q Waves: None  Comparison to prior: No old EKG available    Clinical Impression: NSR with no acute ischemic changes          EKG Interpretation: #2     Interpreted by Dr. Callaway and MIGUEL Cueto CNP  Time reviewed: 9:40pm  Symptoms at time of EKG: mid back and chest pain   Rhythm: Normal sinus   Rate: Bradycardia (52bpm)  Axis: Normal  Ectopy: None  Conduction: Normal  ST Segments/ T Waves: questionable ST elevation isolated to V2 No acute ischemic changes  Q Waves: None  Comparison to prior: unchanged from prvevious EKG done earlier here    Clinical Impression: sinus bracycardia with no acute ischemic changes       ED Course as of 08/14/23 1015   Sun Aug 13, 2023   1950 At bedside.  I reviewed the lab findings thus far.  She has elevated D-dimer.  Mildly elevated LFTs.  This is concerning in the setting of her mid back pain and recommend chest CT to rule out PE.  Patient is in agreement.  Nausea has resolved after the Zofran.  She still has some moderate mid back pain, we will give her another dose of IV Dilaudid.   2136 At bedside.  I reviewed the CT findings with patient.  She states her pain is starting to come back and now she is having increased pain in the left side of her chest.  We will repeat a troponin and EKG.     Procedures              Results for orders placed or performed during the hospital encounter of 08/13/23 (from the past 24 hour(s))   CBC with platelets differential    Narrative    The following orders were created for panel order CBC with platelets differential.  Procedure                               Abnormality          Status                     ---------                               -----------         ------                     CBC with platelets and d...[[id number]]  Abnormal            Final result                 Please view results for these tests on the individual orders.   Comprehensive metabolic panel   Result Value Ref Range    Sodium 138 136 - 145 mmol/L    Potassium 3.7 3.4 - 5.3 mmol/L    Chloride 101 98 - 107 mmol/L    Carbon Dioxide (CO2) 26 22 - 29 mmol/L    Anion Gap 11 7 - 15 mmol/L    Urea Nitrogen 8.0 6.0 - 20.0 mg/dL    Creatinine 0.83 0.51 - 0.95 mg/dL    Calcium 9.2 8.6 - 10.0 mg/dL    Glucose 114 (H) 70 - 99 mg/dL    Alkaline Phosphatase 135 (H) 35 - 104 U/L     (H) 0 - 45 U/L    ALT 95 (H) 0 - 50 U/L    Protein Total 7.3 6.4 - 8.3 g/dL    Albumin 4.2 3.5 - 5.2 g/dL    Bilirubin Total 1.0 <=1.2 mg/dL    GFR Estimate >90 >60 mL/min/1.73m2   Lipase   Result Value Ref Range    Lipase 27 13 - 60 U/L   Troponin T, High Sensitivity   Result Value Ref Range    Troponin T, High Sensitivity 13 <=14 ng/L   D dimer quantitative   Result Value Ref Range    D-Dimer Quantitative 0.83 (H) 0.00 - 0.50 ug/mL FEU    Narrative    This D-dimer assay is intended for use in conjunction with a clinical pretest probability assessment model to exclude pulmonary embolism (PE) and deep venous thrombosis (DVT) in outpatients suspected of PE or DVT. The cut-off value is 0.50 ug/mL FEU.   [place] Draw    Narrative    The following orders were created for panel order [place] Draw.  Procedure                               Abnormality         Status                     ---------                               -----------         ------                     Extra Red Top Tube[[id number]]                               Final result                 Please view results for these tests on the individual orders.   CBC with platelets and differential   Result Value Ref Range    WBC Count 7.3 4.0 - 11.0 10e3/uL    RBC Count 4.96 3.80 -  5.20 10e6/uL    Hemoglobin 12.5 11.7 - 15.7 g/dL    Hematocrit 39.0 35.0 - 47.0 %    MCV 79 78 - 100 fL    MCH 25.2 (L) 26.5 - 33.0 pg    MCHC 32.1 31.5 - 36.5 g/dL    RDW 15.6 (H) 10.0 - 15.0 %    Platelet Count 395 150 - 450 10e3/uL    % Neutrophils 70 %    % Lymphocytes 22 %    % Monocytes 8 %    % Eosinophils 0 %    % Basophils 0 %    % Immature Granulocytes 0 %    NRBCs per 100 WBC 0 <1 /100    Absolute Neutrophils 5.1 1.6 - 8.3 10e3/uL    Absolute Lymphocytes 1.6 0.8 - 5.3 10e3/uL    Absolute Monocytes 0.6 0.0 - 1.3 10e3/uL    Absolute Eosinophils 0.0 0.0 - 0.7 10e3/uL    Absolute Basophils 0.0 0.0 - 0.2 10e3/uL    Absolute Immature Granulocytes 0.0 <=0.4 10e3/uL    Absolute NRBCs 0.0 10e3/uL   Extra Red Top Tube   Result Value Ref Range    Hold Specimen JIC    CT Chest Pulmonary Embolism w Contrast    Narrative    EXAM: CT CHEST PULMONARY EMBOLISM W CONTRAST  LOCATION: [facility]  DATE: 8/13/2023    INDICATION: Mid back pain, elevated d dimer  COMPARISON: None.  TECHNIQUE: CT chest pulmonary angiogram during arterial phase injection of IV contrast. Multiplanar reformats and MIP reconstructions were performed. Dose reduction techniques were used.   CONTRAST: 85 mL Isovue 370    FINDINGS:  ANGIOGRAM CHEST: Pulmonary arteries are normal caliber and negative for pulmonary emboli. Thoracic aorta is negative for dissection. No CT evidence of right heart strain.    LUNGS AND PLEURA: Normal.    MEDIASTINUM/AXILLAE: Normal heart size. No pericardial effusions. No adenopathy. The esophagus is unremarkable.    CORONARY ARTERY CALCIFICATION: None.    UPPER ABDOMEN: Normal.    MUSCULOSKELETAL: Normal. No compression deformities in the thoracic spine. No focal osseous lesions.      Impression    IMPRESSION:  1.  Normal CT of the chest. No evidence of acute pulmonary embolism.     Troponin T, High Sensitivity   Result Value Ref Range    Troponin T, High Sensitivity <6 <=14 ng/L       Medications    0.9% sodium chloride BOLUS (0 mLs Intravenous Stopped 8/13/23 2154)   HYDROmorphone (PF) (DILAUDID) injection 0.5 mg (0.5 mg Intravenous $Given 8/13/23 2157)   iopamidol (ISOVUE-370) solution 85 mL (85 mLs Intravenous $Given 8/13/23 2102)   sodium chloride 0.9 % bag 500mL for CT scan flush use (100 mLs Intravenous $Given 8/13/23 2102)       Assessments & Plan (with Medical Decision Making)   33 year old female with diffuse mid and upper back pain that starting while at work last night (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 No known injury. Pain wraps around her ribs.  Going through to her chest.  Patient has diffuse tenderness to the mid and upper thoracic region.She does not tolerate light palpation to the skin of the upper back without showing severe pain and cringing. Pain out of proportion. No overlying skin changes.   I have low suspicion for cardiac etiology.  --Serial EKGs are NSR with no acute ischemic changes.  --Serial Troponin x2 are negative.  She had elevated D-dimer, so Chest CT r/o PE was obtained which was negative for PE. Lungs are clear, no evidence of pneumonia. Thoracic spine on CT of the chest appears normal.   She has elevated  and ALT 95. Unclear significance. She does not have abdominal tenderness, negative Fuller sign to suggest cholecystitis. Normal Lipase, so not likely pancreatitis.  Remainder of labs are unremarkable.  I suspect patient has musculoskeletal pain and upper back spasms.  She was given IV fluids, IV Zofran, and IV Dilaudid. She had taken ibuprofen at home so not able to give her IV Toradol.  She did achieve relief of pain and nausea after IV medications.      Plan:  I suspect your pain is musculoskeletal.   You do have some mildly elevated liver function tests and you should have these rechecked in clinic in the next couple weeks.    Heating pad.  Flexeril 10 mg three times a day as needed (muscle relaxer).  Tylenol 650 mg every 4-6 hours as needed for  pain.  Ibuprofen 400-600 mg every 6-8 hours as needed for pain  (take with food, stop if causing stomach pains.)  Oxycodone 5 mg every 6 hours as needed for severe pain.  Use sparingly.  Do not drive or drink alcohol on this medication.    Make appointment for recheck in clinic.  Return to the emergency department for fevers, worsening pain, or any other symptoms of concern.        PDMP Review         Value Time User    State PDMP site checked  Yes 8/13/2023 10:17 PM Kayla Hackett APRN CNP              Discharge Medication List as of 8/13/2023 10:23 PM        START taking these medications    Details   cyclobenzaprine (FLEXERIL) 10 MG tablet Take 1 tablet (10 mg) by mouth 3 times daily as needed for muscle spasms, Disp-15 tablet, R-0, [facility]             Final diagnoses:   Acute midline thoracic back pain   Chest pain, unspecified type       8/13/2023   [facility] EMERGENCY DEPT       Kayla Hackett APRN CNP  08/14/23 1020

## 2023-08-14 NOTE — DISCHARGE INSTRUCTIONS
I suspect your pain is musculoskeletal.   You do have some mildly elevated liver function tests and you should have these rechecked in clinic in the next couple weeks.    Heating pad.  Flexeril 10 mg three times a day as needed (muscle relaxer).  Tylenol 650 mg every 4-6 hours as needed for pain.  Ibuprofen 400-600 mg every 6-8 hours as needed for pain  (take with food, stop if causing stomach pains.)  Oxycodone 5 mg every 6 hours as needed for severe pain.  Use sparingly.  Do not drive or drink alcohol on this medication.    Make appointment for recheck in clinic.  Return to the emergency department for fevers, worsening pain, or any other symptoms of concern.

## 2023-12-28 ENCOUNTER — APPOINTMENT (OUTPATIENT)
Dept: ULTRASOUND IMAGING | Facility: CLINIC | Age: 33
End: 2023-12-28
Attending: EMERGENCY MEDICINE

## 2023-12-28 ENCOUNTER — HOSPITAL ENCOUNTER (EMERGENCY)
Facility: CLINIC | Age: 33
Discharge: HOME | End: 2023-12-29
Attending: STUDENT IN AN ORGANIZED HEALTH CARE EDUCATION/TRAINING PROGRAM | Admitting: STUDENT IN AN ORGANIZED HEALTH CARE EDUCATION/TRAINING PROGRAM

## 2023-12-28 VITALS
TEMPERATURE: 98.3 F | OXYGEN SATURATION: 97 % | HEART RATE: 69 BPM | RESPIRATION RATE: 18 BRPM | HEIGHT: 63 IN | WEIGHT: 180 LBS | SYSTOLIC BLOOD PRESSURE: 136 MMHG | DIASTOLIC BLOOD PRESSURE: 81 MMHG | BODY MASS INDEX: 31.89 KG/M2

## 2023-12-28 DIAGNOSIS — O03.9 MISCARRIAGE: ICD-10-CM

## 2023-12-28 LAB
ABO/RH(D): NORMAL
ANION GAP SERPL CALCULATED.3IONS-SCNC: 14 MMOL/L (ref 7–15)
ANTIBODY SCREEN: NEGATIVE
BASOPHILS # BLD AUTO: 0 10E3/UL (ref 0–0.2)
BASOPHILS NFR BLD AUTO: 0 %
BUN SERPL-MCNC: 8.2 MG/DL (ref 6–20)
CALCIUM SERPL-MCNC: 9.2 MG/DL (ref 8.6–10)
CHLORIDE SERPL-SCNC: 104 MMOL/L (ref 98–107)
CREAT SERPL-MCNC: 0.84 MG/DL (ref 0.51–0.95)
DEPRECATED HCO3 PLAS-SCNC: 21 MMOL/L (ref 22–29)
EGFRCR SERPLBLD CKD-EPI 2021: >90 ML/MIN/1.73M2
EOSINOPHIL # BLD AUTO: 0.1 10E3/UL (ref 0–0.7)
EOSINOPHIL NFR BLD AUTO: 1 %
ERYTHROCYTE [DISTWIDTH] IN BLOOD BY AUTOMATED COUNT: 16.3 % (ref 10–15)
GLUCOSE SERPL-MCNC: 122 MG/DL (ref 70–99)
HCG INTACT+B SERPL-ACNC: 5367 MIU/ML
HCT VFR BLD AUTO: 39 % (ref 35–47)
HGB BLD-MCNC: 12.5 G/DL (ref 11.7–15.7)
HOLD SPECIMEN: NORMAL
IMM GRANULOCYTES # BLD: 0.1 10E3/UL
IMM GRANULOCYTES NFR BLD: 0 %
LYMPHOCYTES # BLD AUTO: 2.7 10E3/UL (ref 0.8–5.3)
LYMPHOCYTES NFR BLD AUTO: 22 %
MCH RBC QN AUTO: 25.7 PG (ref 26.5–33)
MCHC RBC AUTO-ENTMCNC: 32.1 G/DL (ref 31.5–36.5)
MCV RBC AUTO: 80 FL (ref 78–100)
MONOCYTES # BLD AUTO: 1 10E3/UL (ref 0–1.3)
MONOCYTES NFR BLD AUTO: 8 %
NEUTROPHILS # BLD AUTO: 8.5 10E3/UL (ref 1.6–8.3)
NEUTROPHILS NFR BLD AUTO: 69 %
NRBC # BLD AUTO: 0 10E3/UL
NRBC BLD AUTO-RTO: 0 /100
PLATELET # BLD AUTO: 439 10E3/UL (ref 150–450)
POTASSIUM SERPL-SCNC: 3.6 MMOL/L (ref 3.4–5.3)
RBC # BLD AUTO: 4.86 10E6/UL (ref 3.8–5.2)
SODIUM SERPL-SCNC: 139 MMOL/L (ref 135–145)
SPECIMEN EXPIRATION DATE: NORMAL
WBC # BLD AUTO: 12.4 10E3/UL (ref 4–11)

## 2023-12-28 PROCEDURE — 85004 AUTOMATED DIFF WBC COUNT: CPT | Performed by: EMERGENCY MEDICINE

## 2023-12-28 PROCEDURE — 80048 BASIC METABOLIC PNL TOTAL CA: CPT | Performed by: STUDENT IN AN ORGANIZED HEALTH CARE EDUCATION/TRAINING PROGRAM

## 2023-12-28 PROCEDURE — 250N000011 HC RX IP 250 OP 636: Performed by: STUDENT IN AN ORGANIZED HEALTH CARE EDUCATION/TRAINING PROGRAM

## 2023-12-28 PROCEDURE — 86900 BLOOD TYPING SEROLOGIC ABO: CPT | Performed by: STUDENT IN AN ORGANIZED HEALTH CARE EDUCATION/TRAINING PROGRAM

## 2023-12-28 PROCEDURE — 76801 OB US < 14 WKS SINGLE FETUS: CPT

## 2023-12-28 PROCEDURE — 84702 CHORIONIC GONADOTROPIN TEST: CPT | Performed by: EMERGENCY MEDICINE

## 2023-12-28 PROCEDURE — 96374 THER/PROPH/DIAG INJ IV PUSH: CPT

## 2023-12-28 PROCEDURE — 36415 COLL VENOUS BLD VENIPUNCTURE: CPT | Performed by: EMERGENCY MEDICINE

## 2023-12-28 PROCEDURE — 99284 EMERGENCY DEPT VISIT MOD MDM: CPT | Performed by: STUDENT IN AN ORGANIZED HEALTH CARE EDUCATION/TRAINING PROGRAM

## 2023-12-28 PROCEDURE — 84702 CHORIONIC GONADOTROPIN TEST: CPT | Performed by: STUDENT IN AN ORGANIZED HEALTH CARE EDUCATION/TRAINING PROGRAM

## 2023-12-28 PROCEDURE — 99285 EMERGENCY DEPT VISIT HI MDM: CPT | Mod: 25

## 2023-12-28 PROCEDURE — 85025 COMPLETE CBC W/AUTO DIFF WBC: CPT | Performed by: STUDENT IN AN ORGANIZED HEALTH CARE EDUCATION/TRAINING PROGRAM

## 2023-12-28 PROCEDURE — 80048 BASIC METABOLIC PNL TOTAL CA: CPT | Performed by: EMERGENCY MEDICINE

## 2023-12-28 RX ORDER — KETOROLAC TROMETHAMINE 15 MG/ML
15 INJECTION, SOLUTION INTRAMUSCULAR; INTRAVENOUS ONCE
Status: COMPLETED | OUTPATIENT
Start: 2023-12-28 | End: 2023-12-28

## 2023-12-28 RX ADMIN — KETOROLAC TROMETHAMINE 15 MG: 15 INJECTION, SOLUTION INTRAMUSCULAR; INTRAVENOUS at 23:09

## 2023-12-28 ASSESSMENT — ACTIVITIES OF DAILY LIVING (ADL)
ADLS_ACUITY_SCORE: 33
ADLS_ACUITY_SCORE: 35

## 2023-12-29 NOTE — ED PROVIDER NOTES
History     Chief Complaint   Patient presents with    Miscarriage     Pt reports about an hour ago felt a gush of blood, went to the bathroom and felt something pass through along with the blood. Pt noted some tissue in the toilet. Pt denies pain at this time.     HPI  Marilou Nicolas is a 33 year old female who is 12 weeks pregnant and has a history of miscarriages who presents to the emergency department with concern for miscarriage.  Patient states that she was sitting on the couch with her child watching TV when suddenly she began having abdominal cramping.  She went to the bathroom and had a large gush of blood.  This was at approximately 730.  Then when she came to the ER shortly after arrival she had further cramping and passed even more blood and large clots and tissue.  She just went to the bathroom again and is still passing a small amount of clots and blood, but she states it is significantly less than earlier.  She has had some cramping, but otherwise no symptoms.  Denies pain at this time.  She denies syncope.  No lightheadedness or dizziness.  She states that she does have a history of a miscarriage about 10 years ago.  She denies fever.  Her last period was sometime in the beginning of October.  She does not have any other complaints.    Allergies:  Allergies   Allergen Reactions    Morphine And Related      vomiting    Milk Products     Pcn [Penicillins] Other (See Comments)    Penicillin [Penicillins]      Rash      Sudafed [Acetaminophen]        Problem List:    Patient Active Problem List    Diagnosis Date Noted    CARDIOVASCULAR SCREENING; LDL GOAL LESS THAN 160 10/31/2010     Priority: Medium    Sensory disorder of eyelid 03/31/2008     Priority: Medium     Problem list name updated by automated process. Provider to review      Pain in limb 03/31/2008     Priority: Medium    Raynaud's syndrome 12/27/2006     Priority: Medium        Past Medical History:    Past Medical History:   Diagnosis  "Date    Raynaud disease        Past Surgical History:    Past Surgical History:   Procedure Laterality Date    KIDNEY SURGERY  2016    SURGICAL HISTORY OF -       Bilat myringotomy tubes       Family History:    Family History   Problem Relation Age of Onset    Cancer Mother         skin cancer    Cancer Paternal Grandmother         skin cancer    Heart Disease Paternal Grandfather         MI       Social History:  Marital Status:   [2]  Social History     Tobacco Use    Smoking status: Never    Smokeless tobacco: Never   Substance Use Topics    Alcohol use: No    Drug use: No        Medications:    cyclobenzaprine (FLEXERIL) 10 MG tablet  norethindrone (MICRONOR) 0.35 MG tablet  oxyCODONE (ROXICODONE) 5 MG tablet          Review of Systems  See HPI  Physical Exam   BP: (!) 157/103  Pulse: 91  Temp: 98.3  F (36.8  C)  Resp: 18  Height: 160 cm (5' 3\")  Weight: 81.6 kg (180 lb)  SpO2: 97 %      Physical Exam  /81   Pulse 69   Temp 98.3  F (36.8  C) (Oral)   Resp 18   Ht 1.6 m (5' 3\")   Wt 81.6 kg (180 lb)   SpO2 97%   BMI 31.89 kg/m    General: alert, interactive, in no apparent distress  Head: atraumatic  Nose: no rhinorrhea or epistaxis  Ears: no external auditory canal discharge or bleeding.    Eyes: Sclera nonicteric. Conjunctiva noninjected. PERRL, EOMI  Mouth: no tonsillar erythema, edema, or exudate.  Moist mucous membranes  Neck: supple, moving spontaneously no midline cervical tenderness  Lungs: No increased work of breathing.  Clear to auscultation bilaterally.  CV: RRR, peripheral pulses palpable and symmetric  Abdomen: soft, nt, nd, no guarding or rebound.   Extremities: Warm and well-perfused.  Skin: no rash or diaphoresis  Neuro: CN II-XII grossly intact, strength 5/5 in UE and LEs bilaterally, sensation intact to light touch in UE and LEs bilaterally;     ED Course                 Procedures           Critical Care time:  none         Results for orders placed or performed during the " hospital encounter of 12/28/23 (from the past 24 hour(s))   [place] Draw    Narrative    The following orders were created for panel order [place] Draw.  Procedure                               Abnormality         Status                     ---------                               -----------         ------                     Extra Red Top Tube[[id number]]                               Final result               Extra Green Top (Lithium...[[id number]]                                                 Extra Purple Top Tube[[id number]]                                                         Please view results for these tests on the individual orders.   ABO/Rh type and screen    Narrative    The following orders were created for panel order ABO/Rh type and screen.  Procedure                               Abnormality         Status                     ---------                               -----------         ------                     Adult Type and Screen[[id number]]                            Edited Result - FINAL        Please view results for these tests on the individual orders.   CBC with platelets, differential    Narrative    The following orders were created for panel order CBC with platelets, differential.  Procedure                               Abnormality         Status                     ---------                               -----------         ------                     CBC with platelets and d...[[id number]]  Abnormal            Final result                 Please view results for these tests on the individual orders.   Basic metabolic panel   Result Value Ref Range    Sodium 139 135 - 145 mmol/L    Potassium 3.6 3.4 - 5.3 mmol/L    Chloride 104 98 - 107 mmol/L    Carbon Dioxide (CO2) 21 (L) 22 - 29 mmol/L    Anion Gap 14 7 - 15 mmol/L    Urea Nitrogen 8.2 6.0 - 20.0 mg/dL    Creatinine 0.84 0.51 - 0.95 mg/dL    GFR Estimate >90 >60 mL/min/1.73m2    Calcium 9.2 8.6 - 10.0 mg/dL    Glucose 122 (H) 70 - 99  mg/dL   HCG quantitative pregnancy (blood)   Result Value Ref Range    hCG Quantitative 5,367 (H) <5 mIU/mL   Extra Red Top Tube   Result Value Ref Range    Hold Specimen [facility]    Adult Type and Screen   Result Value Ref Range    ABO/RH(D) B POS     Antibody Screen Negative Negative    SPECIMEN EXPIRATION DATE [id number]    CBC with platelets and differential   Result Value Ref Range    WBC Count 12.4 (H) 4.0 - 11.0 10e3/uL    RBC Count 4.86 3.80 - 5.20 10e6/uL    Hemoglobin 12.5 11.7 - 15.7 g/dL    Hematocrit 39.0 35.0 - 47.0 %    MCV 80 78 - 100 fL    MCH 25.7 (L) 26.5 - 33.0 pg    MCHC 32.1 31.5 - 36.5 g/dL    RDW 16.3 (H) 10.0 - 15.0 %    Platelet Count 439 150 - 450 10e3/uL    % Neutrophils 69 %    % Lymphocytes 22 %    % Monocytes 8 %    % Eosinophils 1 %    % Basophils 0 %    % Immature Granulocytes 0 %    NRBCs per 100 WBC 0 <1 /100    Absolute Neutrophils 8.5 (H) 1.6 - 8.3 10e3/uL    Absolute Lymphocytes 2.7 0.8 - 5.3 10e3/uL    Absolute Monocytes 1.0 0.0 - 1.3 10e3/uL    Absolute Eosinophils 0.1 0.0 - 0.7 10e3/uL    Absolute Basophils 0.0 0.0 - 0.2 10e3/uL    Absolute Immature Granulocytes 0.1 <=0.4 10e3/uL    Absolute NRBCs 0.0 10e3/uL   US OB <14 Weeks W Transvaginal    Narrative    EXAM: US OB <14 WEEKS WITH TRANSVAGINAL SINGLE  LOCATION: [facility]  DATE: 12/28/2023    INDICATION: likely  early pregnancy loss  COMPARISON: None.  TECHNIQUE: Transabdominal scans were performed. Endovaginal ultrasound was performed to better visualize the endometrium and adnexa.    FINDINGS:  UTERUS: No intrauterine pregnancy identified. Thickened heterogeneous endometrium measuring up to 1.9 cm. No endometrial vascularity.  RIGHT OVARY: Not visualized, likely obscured by bowel gas.  LEFT OVARY: 3.0 x 1.8 x 1.7 cm, containing a corpus luteum.    No significant free fluid.      Impression    IMPRESSION:   1.  No intrauterine pregnancy identified, consistent with pregnancy failure. Thickened  heterogeneous endometrium likely due to blood products.           Medications   ketorolac (TORADOL) injection 15 mg (15 mg Intravenous $Given 12/28/23 6533)       Assessments & Plan (with Medical Decision Making)     I have reviewed the nursing notes.    I have reviewed the findings, diagnosis, plan and need for follow up with the patient.    Medical Decision Making  Marilou Nicolas is a 33 year old female who is 12 weeks pregnant and has a history of miscarriages who presents to the emergency department with concern for miscarriage.  Vital signs reviewed and notable for hypertension, otherwise reassuring.  Patient is well-appearing on exam.  She is not have any abdominal tenderness on my exam.  No syncope.  Ultrasound confirms suspicion of miscarriage.  Labs are fairly reassuring.  Elevated white count likely secondary to stress demargination.  Her hemoglobin is normal.  She is Rh+ and does not require RhoGAM.  She was given Toradol for symptoms.  I recommended that she follow-up with OB to discuss as this is not her first miscarriage.  Additional anticipatory guidance discussed.  Return precautions discussed.  All questions answered.  Patient discharged in stable condition.        New Prescriptions    No medications on file       Final diagnoses:   Miscarriage       12/28/2023   [facility] EMERGENCY DEPT       Jamaal Alvarez MD  12/28/23 1136

## 2023-12-29 NOTE — DISCHARGE INSTRUCTIONS
Your ultrasound confirmed a miscarriage.  You should follow-up with your OB doctor as soon as you are able.  You will likely still have some cramping and you may still have some light bleeding and this is normal.  You can use ibuprofen or Tylenol for cramping.  Get plenty of rest.  If you develop any new or worsening symptoms return to the emergency department.

## 2023-12-29 NOTE — ED NOTES
Pt reports she is about 12 weeks pregnant, went to the bathroom and felt a large gush of blood along with tissue products x 2 episodes. Pt reports this is her second miscarriage. First one she did not need surgical interventions. Has 2 living children. Vitally stable. Pt reports she is still bleeding, but is minimal.  Writer updated Dr. Shankar.

## 2023-12-29 NOTE — ED TRIAGE NOTES
Pt reports about an hour ago felt a gush of blood, went to the bathroom and felt something pass through along with the blood. Pt noted some tissue in the toilet. Pt denies pain at this time.

## 2024-01-02 ENCOUNTER — LAB REQUISITION (OUTPATIENT)
Dept: LAB | Facility: CLINIC | Age: 34
End: 2024-01-02

## 2024-01-02 ENCOUNTER — PATIENT OUTREACH (OUTPATIENT)
Dept: FAMILY MEDICINE | Facility: CLINIC | Age: 34
End: 2024-01-02

## 2024-01-02 DIAGNOSIS — O02.1 MISSED ABORTION: ICD-10-CM

## 2024-01-02 PROCEDURE — 85027 COMPLETE CBC AUTOMATED: CPT | Performed by: PHYSICIAN ASSISTANT

## 2024-01-02 PROCEDURE — 84702 CHORIONIC GONADOTROPIN TEST: CPT | Performed by: PHYSICIAN ASSISTANT

## 2024-01-02 NOTE — TELEPHONE ENCOUNTER
"ED/Discharge Protocol    \"Hi, my name is Magnolia Puente RN, a registered nurse, and I am calling on behalf of [facility] with [facility].  I am calling to follow up and see how things are going for you after your recent visit.\"    Patient followed up with outside provider OB.    Is patient experiencing symptoms that may require a hospital visit?  no    Discharge Instructions    \"Let's review your discharge instructions.  What is/are the follow-up recommendations?  Pt. Response: patient expressed understanding    \"Were you instructed to make a follow-up appointment?\"  Pt. Response: patient has scheduled with OB      \"When you see the provider, I would recommend that you bring your discharge instructions with you.      Call Summary    \"Do you have any questions or concerns about your condition or care plan at the moment?\"    Triage nurse advice given: Follow AVS and attend scheduled appointments.     \"If you have questions or things don't continue to improve, we encourage you contact us through the main clinic number,  [phone].  Even if the clinic is not open, triage nurses are available 24/7 to help you.     We would like you to know that our clinic has extended hours (provide information).  We also have urgent care (provide details on closest location and hours/contact info)\"      \"Thank you for your time and take care!\"    Magnolia Puente RN on 1/2/2024 at 4:58 PM    "

## 2024-01-02 NOTE — TELEPHONE ENCOUNTER
What type of discharge? Emergency Department  Risk of Hospital admission or ED visit: 48.7%  Is a TCM episode required? No  When should the patient follow up with PCP? 7 days of discharge.

## 2024-01-03 LAB
ERYTHROCYTE [DISTWIDTH] IN BLOOD BY AUTOMATED COUNT: 16.2 % (ref 10–15)
HCG INTACT+B SERPL-ACNC: 710 MIU/ML
HCT VFR BLD AUTO: 42 % (ref 35–47)
HGB BLD-MCNC: 12.7 G/DL (ref 11.7–15.7)
MCH RBC QN AUTO: 25.7 PG (ref 26.5–33)
MCHC RBC AUTO-ENTMCNC: 30.2 G/DL (ref 31.5–36.5)
MCV RBC AUTO: 85 FL (ref 78–100)
PLATELET # BLD AUTO: 526 10E3/UL (ref 150–450)
RBC # BLD AUTO: 4.95 10E6/UL (ref 3.8–5.2)
WBC # BLD AUTO: 9.4 10E3/UL (ref 4–11)

## 2024-01-10 ENCOUNTER — LAB REQUISITION (OUTPATIENT)
Dept: LAB | Facility: CLINIC | Age: 34
End: 2024-01-10

## 2024-01-10 DIAGNOSIS — O02.1 MISSED ABORTION: ICD-10-CM

## 2024-01-10 PROCEDURE — 84702 CHORIONIC GONADOTROPIN TEST: CPT | Performed by: PHYSICIAN ASSISTANT

## 2024-01-11 LAB — HCG INTACT+B SERPL-ACNC: 47 MIU/ML

## 2024-01-16 ENCOUNTER — LAB REQUISITION (OUTPATIENT)
Dept: LAB | Facility: CLINIC | Age: 34
End: 2024-01-16

## 2024-01-16 DIAGNOSIS — O02.1 MISSED ABORTION: ICD-10-CM

## 2024-01-16 PROCEDURE — 84702 CHORIONIC GONADOTROPIN TEST: CPT | Performed by: PHYSICIAN ASSISTANT

## 2024-01-17 LAB — HCG INTACT+B SERPL-ACNC: 8 MIU/ML

## 2024-01-23 ENCOUNTER — LAB REQUISITION (OUTPATIENT)
Dept: LAB | Facility: CLINIC | Age: 34
End: 2024-01-23

## 2024-01-23 DIAGNOSIS — O02.1 MISSED ABORTION: ICD-10-CM

## 2024-01-23 LAB — HCG INTACT+B SERPL-ACNC: 1 MIU/ML

## 2024-01-23 PROCEDURE — 84702 CHORIONIC GONADOTROPIN TEST: CPT | Performed by: PHYSICIAN ASSISTANT

## 2024-06-16 ENCOUNTER — HEALTH MAINTENANCE LETTER (OUTPATIENT)
Age: 34
End: 2024-06-16

## 2024-10-08 ENCOUNTER — MEDICAL CORRESPONDENCE (OUTPATIENT)
Dept: HEALTH INFORMATION MANAGEMENT | Facility: CLINIC | Age: 34
End: 2024-10-08

## 2024-10-08 ENCOUNTER — LAB REQUISITION (OUTPATIENT)
Dept: LAB | Facility: CLINIC | Age: 34
End: 2024-10-08

## 2024-10-08 ENCOUNTER — TRANSFERRED RECORDS (OUTPATIENT)
Dept: HEALTH INFORMATION MANAGEMENT | Facility: CLINIC | Age: 34
End: 2024-10-08

## 2024-10-08 DIAGNOSIS — Z87.448 PERSONAL HISTORY OF OTHER DISEASES OF URINARY SYSTEM: ICD-10-CM

## 2024-10-08 DIAGNOSIS — Z36.9 ENCOUNTER FOR ANTENATAL SCREENING, UNSPECIFIED: ICD-10-CM

## 2024-10-08 LAB
ALBUMIN MFR UR ELPH: 25.3 MG/DL
BASOPHILS # BLD AUTO: 0 10E3/UL (ref 0–0.2)
BASOPHILS NFR BLD AUTO: 0 %
CREAT UR-MCNC: 207 MG/DL
EOSINOPHIL # BLD AUTO: 0.1 10E3/UL (ref 0–0.7)
EOSINOPHIL NFR BLD AUTO: 1 %
ERYTHROCYTE [DISTWIDTH] IN BLOOD BY AUTOMATED COUNT: 15.3 % (ref 10–15)
EST. AVERAGE GLUCOSE BLD GHB EST-MCNC: 114 MG/DL
HBA1C MFR BLD: 5.6 %
HCT VFR BLD AUTO: 41.9 % (ref 35–47)
HGB BLD-MCNC: 13.2 G/DL (ref 11.7–15.7)
HIV 1+2 AB+HIV1 P24 AG SERPL QL IA: NONREACTIVE
HOLD SPECIMEN: NORMAL
HOLD SPECIMEN: NORMAL
IMM GRANULOCYTES # BLD: 0.1 10E3/UL
IMM GRANULOCYTES NFR BLD: 0 %
LYMPHOCYTES # BLD AUTO: 2.6 10E3/UL (ref 0.8–5.3)
LYMPHOCYTES NFR BLD AUTO: 17 %
MCH RBC QN AUTO: 26.7 PG (ref 26.5–33)
MCHC RBC AUTO-ENTMCNC: 31.5 G/DL (ref 31.5–36.5)
MCV RBC AUTO: 85 FL (ref 78–100)
MONOCYTES # BLD AUTO: 0.8 10E3/UL (ref 0–1.3)
MONOCYTES NFR BLD AUTO: 5 %
NEUTROPHILS # BLD AUTO: 11.9 10E3/UL (ref 1.6–8.3)
NEUTROPHILS NFR BLD AUTO: 77 %
NRBC # BLD AUTO: 0 10E3/UL
NRBC BLD AUTO-RTO: 0 /100
PLATELET # BLD AUTO: 446 10E3/UL (ref 150–450)
PROT/CREAT 24H UR: 0.12 MG/MG CR (ref 0–0.2)
RBC # BLD AUTO: 4.94 10E6/UL (ref 3.8–5.2)
WBC # BLD AUTO: 15.5 10E3/UL (ref 4–11)

## 2024-10-08 PROCEDURE — 82565 ASSAY OF CREATININE: CPT | Performed by: ADVANCED PRACTICE MIDWIFE

## 2024-10-08 PROCEDURE — 86780 TREPONEMA PALLIDUM: CPT | Performed by: ADVANCED PRACTICE MIDWIFE

## 2024-10-08 PROCEDURE — 84450 TRANSFERASE (AST) (SGOT): CPT | Performed by: ADVANCED PRACTICE MIDWIFE

## 2024-10-08 PROCEDURE — 87389 HIV-1 AG W/HIV-1&-2 AB AG IA: CPT | Performed by: ADVANCED PRACTICE MIDWIFE

## 2024-10-08 PROCEDURE — 84520 ASSAY OF UREA NITROGEN: CPT | Performed by: ADVANCED PRACTICE MIDWIFE

## 2024-10-08 PROCEDURE — 87086 URINE CULTURE/COLONY COUNT: CPT | Performed by: ADVANCED PRACTICE MIDWIFE

## 2024-10-08 PROCEDURE — 86900 BLOOD TYPING SEROLOGIC ABO: CPT | Performed by: ADVANCED PRACTICE MIDWIFE

## 2024-10-08 PROCEDURE — 82610 CYSTATIN C: CPT | Performed by: ADVANCED PRACTICE MIDWIFE

## 2024-10-08 PROCEDURE — 86803 HEPATITIS C AB TEST: CPT | Performed by: ADVANCED PRACTICE MIDWIFE

## 2024-10-08 PROCEDURE — 87340 HEPATITIS B SURFACE AG IA: CPT | Performed by: ADVANCED PRACTICE MIDWIFE

## 2024-10-08 PROCEDURE — 84460 ALANINE AMINO (ALT) (SGPT): CPT | Performed by: ADVANCED PRACTICE MIDWIFE

## 2024-10-08 PROCEDURE — 84156 ASSAY OF PROTEIN URINE: CPT | Performed by: ADVANCED PRACTICE MIDWIFE

## 2024-10-08 PROCEDURE — 85004 AUTOMATED DIFF WBC COUNT: CPT | Performed by: ADVANCED PRACTICE MIDWIFE

## 2024-10-08 PROCEDURE — 87624 HPV HI-RISK TYP POOLED RSLT: CPT | Mod: ORL | Performed by: DOULA

## 2024-10-08 PROCEDURE — 86901 BLOOD TYPING SEROLOGIC RH(D): CPT | Performed by: ADVANCED PRACTICE MIDWIFE

## 2024-10-08 PROCEDURE — 83036 HEMOGLOBIN GLYCOSYLATED A1C: CPT | Performed by: ADVANCED PRACTICE MIDWIFE

## 2024-10-08 PROCEDURE — 82040 ASSAY OF SERUM ALBUMIN: CPT | Performed by: ADVANCED PRACTICE MIDWIFE

## 2024-10-08 PROCEDURE — 86762 RUBELLA ANTIBODY: CPT | Performed by: ADVANCED PRACTICE MIDWIFE

## 2024-10-08 PROCEDURE — G0145 SCR C/V CYTO,THINLAYER,RESCR: HCPCS | Mod: ORL | Performed by: DOULA

## 2024-10-09 ENCOUNTER — TRANSCRIBE ORDERS (OUTPATIENT)
Dept: MATERNAL FETAL MEDICINE | Facility: CLINIC | Age: 34
End: 2024-10-09

## 2024-10-09 ENCOUNTER — LAB REQUISITION (OUTPATIENT)
Dept: LAB | Facility: CLINIC | Age: 34
End: 2024-10-09

## 2024-10-09 DIAGNOSIS — O26.90 PREGNANCY RELATED CONDITION, ANTEPARTUM: Primary | ICD-10-CM

## 2024-10-09 DIAGNOSIS — Z12.4 ENCOUNTER FOR SCREENING FOR MALIGNANT NEOPLASM OF CERVIX: ICD-10-CM

## 2024-10-09 LAB
ABO/RH(D): NORMAL
ALBUMIN SERPL BCG-MCNC: 4.3 G/DL (ref 3.5–5.2)
ALT SERPL W P-5'-P-CCNC: 10 U/L (ref 0–50)
ANTIBODY SCREEN: NEGATIVE
AST SERPL W P-5'-P-CCNC: 16 U/L (ref 0–45)
BUN SERPL-MCNC: 7.9 MG/DL (ref 6–20)
CREAT SERPL-MCNC: 0.73 MG/DL (ref 0.51–0.95)
CYSTATIN C (ROCHE): 0.9 MG/L (ref 0.6–1)
EGFRCR SERPLBLD CKD-EPI 2021: >90 ML/MIN/1.73M2
GFR/BSA.PRED SERPLBLD CYS-BASED-ARV: >90 ML/MIN/1.73M2
HBV SURFACE AG SERPL QL IA: NONREACTIVE
HCV AB SERPL QL IA: NONREACTIVE
HPV HR 12 DNA CVX QL NAA+PROBE: NEGATIVE
HPV16 DNA CVX QL NAA+PROBE: NEGATIVE
HPV18 DNA CVX QL NAA+PROBE: NEGATIVE
HUMAN PAPILLOMA VIRUS FINAL DIAGNOSIS: NORMAL
RUBV IGG SERPL QL IA: 1.46 INDEX
RUBV IGG SERPL QL IA: POSITIVE
SPECIMEN EXPIRATION DATE: NORMAL
T PALLIDUM AB SER QL: NONREACTIVE

## 2024-10-10 LAB — BACTERIA UR CULT: NORMAL

## 2024-10-11 ENCOUNTER — APPOINTMENT (OUTPATIENT)
Dept: ADMINISTRATIVE | Facility: CLINIC | Age: 34
End: 2024-10-11

## 2024-10-11 LAB
BKR AP ASSOCIATED HPV REPORT: NORMAL
BKR LAB AP GYN ADEQUACY: NORMAL
BKR LAB AP GYN INTERPRETATION: NORMAL
BKR LAB AP LMP: NORMAL
BKR LAB AP PREVIOUS ABNORMAL: NORMAL
PATH REPORT.COMMENTS IMP SPEC: NORMAL
PATH REPORT.COMMENTS IMP SPEC: NORMAL
PATH REPORT.RELEVANT HX SPEC: NORMAL

## 2024-11-04 ENCOUNTER — TRANSFERRED RECORDS (OUTPATIENT)
Dept: HEALTH INFORMATION MANAGEMENT | Facility: CLINIC | Age: 34
End: 2024-11-04

## 2024-11-04 ENCOUNTER — PRE VISIT (OUTPATIENT)
Dept: MATERNAL FETAL MEDICINE | Facility: HOSPITAL | Age: 34
End: 2024-11-04

## 2024-11-07 ENCOUNTER — OFFICE VISIT (OUTPATIENT)
Dept: MATERNAL FETAL MEDICINE | Facility: HOSPITAL | Age: 34
End: 2024-11-07
Attending: OBSTETRICS & GYNECOLOGY

## 2024-11-07 ENCOUNTER — LAB (OUTPATIENT)
Dept: LAB | Facility: HOSPITAL | Age: 34
End: 2024-11-07
Attending: OBSTETRICS & GYNECOLOGY

## 2024-11-07 ENCOUNTER — ANCILLARY PROCEDURE (OUTPATIENT)
Dept: ULTRASOUND IMAGING | Facility: HOSPITAL | Age: 34
End: 2024-11-07
Attending: OBSTETRICS & GYNECOLOGY

## 2024-11-07 DIAGNOSIS — Z31.5 ENCOUNTER FOR PROCREATIVE GENETIC COUNSELING: ICD-10-CM

## 2024-11-07 DIAGNOSIS — O26.90 PREGNANCY RELATED CONDITION, ANTEPARTUM: ICD-10-CM

## 2024-11-07 DIAGNOSIS — O09.522 MULTIGRAVIDA OF ADVANCED MATERNAL AGE IN SECOND TRIMESTER: Primary | ICD-10-CM

## 2024-11-07 DIAGNOSIS — Z36.9 ENCOUNTER FOR ANTENATAL SCREENING: ICD-10-CM

## 2024-11-07 DIAGNOSIS — Z36.0 ENCOUNTER FOR ANTENATAL SCREENING FOR CHROMOSOMAL ANOMALIES: ICD-10-CM

## 2024-11-07 DIAGNOSIS — Z13.71 TESTING OF FEMALE FOR GENETIC DISEASE CARRIER STATUS: ICD-10-CM

## 2024-11-07 DIAGNOSIS — O09.522 MULTIGRAVIDA OF ADVANCED MATERNAL AGE IN SECOND TRIMESTER: ICD-10-CM

## 2024-11-07 PROCEDURE — 96040 HC GENETIC COUNSELING, EACH 30 MINUTES: CPT

## 2024-11-07 PROCEDURE — 76811 OB US DETAILED SNGL FETUS: CPT

## 2024-11-07 PROCEDURE — 36415 COLL VENOUS BLD VENIPUNCTURE: CPT

## 2024-11-07 PROCEDURE — 76811 OB US DETAILED SNGL FETUS: CPT | Mod: 26 | Performed by: OBSTETRICS & GYNECOLOGY

## 2024-11-07 PROCEDURE — 99203 OFFICE O/P NEW LOW 30 MIN: CPT | Mod: 25 | Performed by: OBSTETRICS & GYNECOLOGY

## 2024-11-07 NOTE — PROGRESS NOTES
[facility]etal [facility] [place]  Genetic Counseling Consult    Patient:  Marilou Nicolas  Preferred Name: Marilou YOB: 1990   Date of Service:  11/07/24   MRN: [id number]    Marilou was seen at the [facility] for genetic consultation. The indication for genetic counseling is advanced maternal age. The patient was accompanied to this visit by their partner, Syed.    The session was conducted in English.      IMPRESSION/ PLAN   1. Marilou has not had genetic screening in this pregnancy but elected to have screening today.     2. During today's [facility] visit, Marilou had a blood draw for expanded non-invasive prenatal testing (also called NIPT, NIPS, or cell-free DNA) through [employer] The expanded NIPT screens for trisomy 21, 18, and 13 and select microdeletion syndromes, including 22q11.2 deletion syndrome. The patient opted to screen for sex chromosome aneuploidies, including reported fetal sex. Results are expected in 7-14 days. The patient will be called with results and if they do not answer they requested a detailed message with results on their voicemail, including the predicted fetal sex information.  Patient was informed that results, including fetal sex, will be available in [facility].    3. Since the patient chose aneuploidy screening via NIPT, quad screen is NOT recommended in the second trimester. If the patient desires screening for open neural tube defects, maternal serum AFP only is recommended, ideally between 16-18 weeks gestation.    4. During today's [facility] visit, Marilou had a blood draw for carrier screening (267 gene panel through [facility]). The results typically take 2-3 weeks. They will be available in EPIC and routed to the referring OB provider. The patient can view them in [facility] and the lab's patient portal. Marilou will also be called to discuss the results. She gave verbal permission for her results to  "be left in her voicemail. Marilou 's partner, Syed, can also contact me if he would like to pursue his own carrier screening. Marilou and Syed both signed authorization to communicate forms so that her results can be discussed with one another.     5. Marilou had a level II comprehensive anatomy ultrasound today. Please see the ultrasound report for further details.    6. Further recommendation include a follow-up ultrasound with [facility]. The upcoming ultrasound has been scheduled for 2024.    PREGNANCY HISTORY   /Parity:       Shawnas pregnancy history is significant for:   2 SAB  Term female ()   male ()  Reports he is now ~1 years old has developmental delay  Pre-e with severe features with MIOL at 34w6d  Placental abruption  PPD    CURRENT PREGNANCY   Current Age: 34 year old   Age at Delivery: 34 year old  KADEN: 2025, by Last Menstrual Period                                   Gestational Age: 18w2d  This pregnancy is a single gestation.     MEDICAL HISTORY   Per the medical record, Marilou's medical history includes kidney disease (failure in 207), Raynaud's, and a heart murmur. For a complete review of her diagnoses and medical history, please refer to the medical record.       FAMILY HISTORY   A three-generation pedigree was obtained today and is scanned under the \"Media\" tab in Epic. The family history was reported by Marilou and their partner.    The following significant findings were reported today for Marilou's family:   Mother had 5 SABs, DM, skin cancer (basal cell carcinoma) under 50, and breast cancer over 50  Father passed away at 67 from a fall; he has a history of asthma, heart medication use, and possibly a heart murmur  Brother has HPTN and had testicular cancer under age 50  Sister has HPTN and possibly a heart murmur  Maternal uncle had cancer and MS  Maternal cousin had MS  Maternal cousin's child (male) has autism  Paternal grandfather had a quad " bypass  Paternal and maternal family history of cancer is reported     Syed is 35 years of age with no major reported health concerns. The following significant findings were reported today for his family:   Mother had 6 SABs; she also has a history of HPTN and DM; she had a kidney tumor or kidney cancer in her 40s  Maternal grandfather had kidney or lung cancer at around 50    Otherwise, the reported family history is unremarkable for other multiple miscarriages, stillbirths, infant deaths, birth defects, intellectual disabilities, other developmental delays, other diagnoses of autism, known genetic conditions, other cancer under the age of 50, and consanguinity.     Autism  Some forms of autism spectrum disorders can be associated with specific genetic conditions, where approximately 15-20% of individuals who have autism will have an identifiable genetic cause for this history.  However, most often these conditions are due to the combination of genes and environmental factors that can affect development.  Since there is a genetic component to autism spectrum disorders, the recurrence risk for other family members is higher among first-degree relatives of the individual with an autism spectrum disorder (full siblings), and decreases with distance in relationship to other second-degree relatives.     Cancer  We discussed how most cancer seen in families occurs sporadically, but about 5-10% may be due to an underlying genetic etiology. We discussed that cancer diagnosed under the age of 50 raises suspicion for a potential hereditary cancer syndrome. Other characteristics that may suggest a hereditary cancer syndrome include cancer in 2 or more close relatives on the same side of the family, multiple primary tumors, bilateral or multiple rare cancers, constellations of tumors associated with a specific cancer syndrome, and evidence of autosomal dominant transmission. While there can be exceptions, testicular cancer  "is often not though to be due to a specific single hereditary cause.     The couple was encouraged to share cancer family history with their health care providers. Even if no hereditary cancer syndrome is identified in a family, individuals may be eligibile for increased screening or risk management options given a family history.     Patients may also consider meeting with a cancer genetic counselor.  If a family wants more information, they can contact the [facility] Cancer Risk Management Program ([phone]). Physicians can also make referrals at [email or web address] or, if within the [place] system, through [facility] referral for \"Cancer Risk Mgmt/Cancer Genetic Counseling\"     The patient was provided a handout on the [facility] Cancer Risk Management Program.     Multiple Sclerosis   Multiple sclerosis (MS) is an autoimmune disease of the central nervous system, characterized by focal inflammation, demyelination, and axonal injury. MS is thought to be multifactorial, meaning a combination of environmental factors and variations in dozens of genes are involved in the development of MS. Given that there is a genetic component, the risk of developing MS is higher for siblings or children of a person with the condition than for the general population.     Recurrent Pregnancy Loss  Recurrent miscarriage is defined as three or more clinically recognized consecutive or non-consecutive pregnancy losses occurring prior to fetal viability (<24 weeks). We discussed that there are multiple potential causes for recurrent pregnancy loss, including genetic factors. For example, familial chromosome rearrangements can increase the risk for recurrent loss.     Heart Murmurs  A history of heart murmurs is reported. The patient did not report a history of congenital heart defect. We briefly reviewed how some heart differences can have genetic risks (such as " multifactorial inheritance).        RISK ASSESSMENT FOR INHERITED CONDITIONS AND CARRIER SCREENING OPTIONS   Expanded carrier screening is available to screen for autosomal recessive conditions and X-linked conditions in a large list of genes. Carrier screening does not test the pregnancy but gives a risk assessment for the pregnancy and future pregnancies to have the condition. Expanded carrier screening is designed to identify carrier status for conditions that are primarily childhood or adolescent onset. Expanded carrier screening does not evaluate for adult-onset conditions such as hereditary cancer syndromes, dementia/Alzheimer's disease, or cardiovascular disease risk factors. Additionally, expanded carrier screening is not comprehensive for all known genetic diseases or inherited conditions. Carrier screening does not test for all genetic and health conditions or risk factors.     Autosomal recessive conditions happen when a mutation has been inherited from the egg and sperm and include conditions like cystic fibrosis, thalassemia, hearing loss, spinal muscular atrophy, and more. We reviewed that when both biological parents carry a harmful genetic change in a gene associated with autosomal recessive inheritance, each of their pregnancies has a 1 in 4 (25%) chance to be affected by that condition. X-linked conditions happen when a mutation has been inherited from the egg and include conditions like fragile X syndrome.With X-linked conditions, the specific risk generally depends on the chromosomal sex of the fetus, with XY individuals (generally male) being most severely affected.     [place] screening was reviewed. [place]  Screening    The patient does NOT have a family history of known inherited conditions. This does NOT mean the patient and/or their partner is not a carrier of a condition. Approximately 90% of couples at an increased reproductive risk for an inherited condition have no family  history of that condition.     The patient has not had carrier screening previously.     The patient elected to pursue carrier screening today. The screening will include 267 conditions through [employer]. See below for the more detailed information we discussed.   Carrier screening does not test the pregnancy but gives a risk assessment for the pregnancy and future pregnancies to have the condition.  There are different size panels or list of conditions for carrier screening.  Some conditions cause health problems for carriers. Carrier screening is not meant to diagnose the patient with a condition, and generally carriers are asymptomatic. However, certain genes may confer increased risks for various health concerns in carriers (including, but not limited to: PIEDAD, DMD, FMR1). While it is not the goal of carrier screening to make diagnoses for parents, sometimes health information or risks can be uncovered.   Carrier screening does not test for all genetic and health conditions or risk factors.  There are limitations to current technology and results may be updated at a later date. Carrier screening will report on variants classified by the lab as pathogenic or likely pathogenic. Although carrier status does not change over time, it is possible that a variant could be reclassified as more information about the variant is learned. If this occurs, the couple can be contacted and a new risk assessment could be provided.   If an individual is a carrier, family members could be as well. The patient is encouraged to share this information with relatives. Additionally, even if there is not a high reproductive risk for a condition, it is possible that carrier status can be passed on to future generations.  We reviewed that there is a law in place, the Genetic Information Nondiscrimination Act (BREONNA), that protects patients from discrimination by health insurance companies and employers based on their genetic  information. [place] does not protect against discrimination by life insurance companies or disability insurance.   The lab will communicate the out-of-pocket cost with the patient and will also provide an option to switch to self-pay. The default is billing through insurance, and it is the patient's responsibility to respond to the communication if they would like to switch to self-pay.  The results typically take 2-3 weeks. They will be available in EPIC and routed to the referring OB provider. The patient can view them in [facility] and the lab's patient portal.    RISK ASSESSMENT FOR CHROMOSOME CONDITIONS   We explained that the risk for fetal chromosome abnormalities increases with maternal age. We discussed specific features of common chromosome abnormalities, including trisomy 21 (Down syndrome), trisomy 13, trisomy 18, and sex chromosome trisomies.    At age 34 at midtrimester, the risk to have a baby with Down syndrome is 1 in 216.   At age 34 at midtrimester, the risk to have a baby with any chromosome abnormality is 1 in 105.     Marilou has not had genetic screening in this pregnancy but elected to have screening today.      GENETIC TESTING OPTIONS   Genetic testing during a pregnancy includes screening and diagnostic procedures.      Screening tests are non-invasive which means no risk to the pregnancy and includes ultrasounds and blood work. The benefits and limitations of screening were reviewed. Screening tests provide a risk assessment (chance) specific to the pregnancy for certain fetal chromosome abnormalities but cannot definitively diagnose or exclude a fetal chromosome abnormality. Follow-up genetic counseling and consideration of diagnostic testing is recommended with any abnormal screening result. Diagnostic testing during a pregnancy is more certain and can test for more conditions. However, the tests do have a risk of miscarriage that requires careful consideration. These tests can detect fetal  chromosome abnormalities with greater than 99% certainty. Results can be compromised by maternal cell contamination or mosaicism and are limited by the resolution of current genetic testing technology.     There is no screening or diagnostic test that detects all forms of birth defects or intellectual disability.     We discussed the following screening options:   Non-invasive prenatal testing (NIPT)  Also called cell-free DNA screening because it detects chromosomes from the placenta in the pregnant person's blood  Can be done any time after 10 weeks gestation  Standard recommendation for NIPT screens for trisomy 21, trisomy 18, trisomy 13, with the option of adding sex chromosome aneuploidies, without or without predicted sex  Cannot screen for open neural tube defects, maternal serum AFP after 15 weeks is recommended  New NIPT options include screening for other trisomies, microdeletion syndromes, and in some cases fetal blood antigens. Guidelines do not recommend these conditions are included in standard screening. These options have limitations and should be discussed with a genetic counselor.   However, current (2023) ACMG guidelines do recommend that screening for one microdeletion syndrome, called 22q11.2 deletion syndrome be offered to all pregnant patients. 22q11.2 deletion syndrome has an estimated prevalence of 1 in 990 to 1 in 2148 (0.05-0.1%). Risk is not thought to increase with maternal age. Clinical features are variable but include congenital heart defects, cleft palate, developmental delays, immune system deficiencies, and hearing loss. Approximately 90% of cases are de susana (a sporadic new change in a pregnancy). Cell-free DNA screening for 22q11.2 deletion syndrome is available with the inclusion of other microdeletion syndromes. There is less data about the performance of cell-free DNA screening for more rare microdeletions and the chance for false positives or negative may be increased.  We  discussed the limitations of cell-free DNA screening in detecting microdeletions and the possiblity of false positives and false negatives. The patient opted into microdeletion syndrome screening.     We discussed the following ultrasound options:  Comprehensive level II ultrasound (Fetal Anatomy Ultrasound)  Ultrasound done between 18-20 weeks gestation  Screens for major birth defects and markers for aneuploidy (like trisomy 21 and trisomy 18)  Includes looking at the fetus/baby's growth, heart, organs (stomach, kidneys), placenta, and amniotic fluid    We discussed the following diagnostic options:   Amniocentesis  Invasive diagnostic procedure done after 15 weeks gestation  The procedure collects a small sample of amniotic fluid for the purpose of chromosomal testing and/or other genetic testing  Diagnostic result; more than 99% sensitivity for fetal chromosome abnormalities  Testing for AFP in the amniotic fluid can test for open neural tube defects    It was a pleasure to be involved with Marilou jimenez care. Face-to-face time of the meeting was  40  minutes.    Clemente Bach MS, [facility]  Board Certified and [place] Licensed Genetic Counselor  [facility]  Maternal Fetal Medicine  Office: [phone]  [facility]: [phone]   Fax: [phone]  [facility]

## 2024-11-07 NOTE — NURSING NOTE
Pt at [facility] for GC/ultrasound- see detailed notes and reports in Epic.  Pt reports positive fetal movement, denies bleeding, cramping, loss of fluid.  Pt reports occasional headaches but denies other concerns.  SBAR given to MD.

## 2024-11-13 LAB — SCANNED LAB RESULT: NORMAL

## 2024-11-14 ENCOUNTER — TELEPHONE (OUTPATIENT)
Dept: MATERNAL FETAL MEDICINE | Facility: HOSPITAL | Age: 34
End: 2024-11-14

## 2024-11-14 ENCOUNTER — TRANSFERRED RECORDS (OUTPATIENT)
Dept: HEALTH INFORMATION MANAGEMENT | Facility: CLINIC | Age: 34
End: 2024-11-14

## 2024-11-14 NOTE — TELEPHONE ENCOUNTER
November 14, 2024    Left a message for Marilou regarding her Prequel (NIPT) results through [facility].     Results indicate NO ANEUPLOIDY DETECTED for chromosomes 21, 18, 13, or sex chromosomes (XY - male predicted fetal sex). Results also indicate NO MICRODELETION DETECTED for the 22q11.2 or other select microdeletions (15q11.2, 1p36, 4p, and 5p).     This puts her current pregnancy at low risk for Down syndrome, trisomy 18, trisomy 13 and sex chromosome abnormalities. This test is reported to have the following sensitivities: Down syndrome: 99.7%, trisomy 18: 97.9%, trisomy 13: 99.0%, monosomy X: 95.8%, XX: 97.6%, and XY: 99.1%. Although these results are reassuring, this does not replace a standard chromosome analysis from a chorionic villus sampling or amniocentesis. The results also reduce, but do not eliminate, the risk for 22q11.2 deletion syndrome and other select microdeletions.    Marilou is scheduled for a follow up comprehensive scan with [facility] on 12/2/24    MSAFP is the appropriate second trimester screening test for open neural tube defects; the maternal quad screen is not recommended.    Her results are available in her Epic chart for her primary OB to review.    Clemente Bach MS, [facility]  Board Certified and [place] Licensed Genetic Counselor  [facility]  Maternal Fetal Medicine  Office: [phone]  [facility]: [phone]   Fax: [phone]  [facility]

## 2024-12-02 ENCOUNTER — ANCILLARY PROCEDURE (OUTPATIENT)
Dept: ULTRASOUND IMAGING | Facility: HOSPITAL | Age: 34
End: 2024-12-02
Attending: STUDENT IN AN ORGANIZED HEALTH CARE EDUCATION/TRAINING PROGRAM

## 2024-12-02 ENCOUNTER — OFFICE VISIT (OUTPATIENT)
Dept: MATERNAL FETAL MEDICINE | Facility: HOSPITAL | Age: 34
End: 2024-12-02
Attending: STUDENT IN AN ORGANIZED HEALTH CARE EDUCATION/TRAINING PROGRAM

## 2024-12-02 ENCOUNTER — TELEPHONE (OUTPATIENT)
Dept: MATERNAL FETAL MEDICINE | Facility: HOSPITAL | Age: 34
End: 2024-12-02

## 2024-12-02 DIAGNOSIS — O09.522 MULTIGRAVIDA OF ADVANCED MATERNAL AGE IN SECOND TRIMESTER: ICD-10-CM

## 2024-12-02 DIAGNOSIS — O09.522 MULTIGRAVIDA OF ADVANCED MATERNAL AGE IN SECOND TRIMESTER: Primary | ICD-10-CM

## 2024-12-02 LAB — SCANNED LAB RESULT: ABNORMAL

## 2024-12-02 PROCEDURE — 76816 OB US FOLLOW-UP PER FETUS: CPT

## 2024-12-02 PROCEDURE — 76816 OB US FOLLOW-UP PER FETUS: CPT | Mod: 26 | Performed by: STUDENT IN AN ORGANIZED HEALTH CARE EDUCATION/TRAINING PROGRAM

## 2024-12-02 PROCEDURE — 99213 OFFICE O/P EST LOW 20 MIN: CPT | Mod: 25 | Performed by: STUDENT IN AN ORGANIZED HEALTH CARE EDUCATION/TRAINING PROGRAM

## 2024-12-02 NOTE — TELEPHONE ENCOUNTER
December 2, 2024    Carrier Screening Results Disclosure  [facility] Maternal Fetal Medicine    I left a voicemail for Marilou today regarding her carrier screening results.    Marilou had a 267 gene panel through [employer]. She screened negative for 266 conditions and was found to harbor a pathogenic variant in 1 gene. Positive results and their respective reproductive risks are outlined below. Her partner, Syed, has not yet had screening. Syed can contact me to assist in coordinating his own screening, if desired.     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 Mutations in the NPHS2 gene cause a form of nephrotic syndrome that is unresponsive to steroid treatment known as steroid-resistant nephrotic syndrome (SRNS).     Symptoms of the condition typically begin between 4 and 12 months of age, but in some cases occur later in childhood. Symptoms of the condition include an excess of protein in the urine (proteinuria), low levels of protein in the blood, kidney failure, and swelling of the body (edema). The swelling can also cause weight gain and high blood pressure. Individuals with nephrotic syndrome are prone to infection due to their inability to retain sufficient amounts of serum antibodies. They are also prone to develop harmful blood clots. Kidney failure typically occurs before the age of 20, and kidney transplantation may allow for a more normal lifespan.    The goal of treatment is to minimize damage to the kidneys. Medication to control blood pressure and high cholesterol may be prescribed. Often children with nephrotic syndrome with protein loss require antibiotics to control for infection. A physician may recommend infusions of  protein for children with SRNS to help replace what is lost in the urine. Diuretic drugs may help eliminate excess water and thus reduce swelling while blood thinners may be required to aid in blood clotting. Typically, kidney failure will occur, and a kidney transplant will be required though symptoms of the disease can recur after transplant.    The prognosis for an individual with nephrotic syndrome, NPHS2-related varies, but with transplantation and careful medical management, affected children can live into adulthood.    Reproductive Risks   Marilou screened negative for 266 conditions. However, a negative result does not completely eliminate the chance to be a carrier. The risk to be a carrier following negative screening is called residual risk.     For the condition Marilou was found to carry, risk for the current pregnancy depends on the carrier status of the reproductive partner. [employer] reports carrier frequencies for conditions included on their screening panels. Assuming [place]  ancestry, the current risk for the pregnancy to have Nephrotic Syndrome (NPHS2-related) is reported by [employer] as 1 in 110,000. If Syed were found to be a carrier, the risk would be 1 in 4.    Familial Screening   Marilou screened positive as a carrier of 1 autosomal recessive condition. For the respective gene related to this condition, she has a 50% chance to pass on the pathogenic variant to each future child. This means each child would have a 50% chance to also be a carrier. Her other first degree relatives also have a 50% risk to carry this condition.  She could share this information with family members who could be at risk to be carriers.    It is a pleasure to be involved in Marilou's care.     Clemente Bach MS, [facility]  Licensed Genetic Counselor  [facility]  Maternal Fetal Medicine  Office: [phone]  [facility]: [phone]   Fax: [phone]  [facility]

## 2024-12-02 NOTE — PROGRESS NOTES
"Please see \"Imaging\" tab under \"Chart Review\" for details of today's visit.    Maribel Garcia    "

## 2024-12-02 NOTE — NURSING NOTE
Marilou Nicolas is a  at 21w6d who presents to [facility] for follow up ultrasound r/t suboptimal anatomy and growth. Pt reports positive fetal movement. Pt denies bldg/lof/change in discharge, contractions, headache, vision changes, chest pain/SOB or edema. SBAR given to Dr. MIRANDA Garcia, see note in Epic.

## 2025-01-10 ENCOUNTER — MEDICAL CORRESPONDENCE (OUTPATIENT)
Dept: HEALTH INFORMATION MANAGEMENT | Facility: CLINIC | Age: 35
End: 2025-01-10

## 2025-01-10 ENCOUNTER — TRANSFERRED RECORDS (OUTPATIENT)
Dept: HEALTH INFORMATION MANAGEMENT | Facility: CLINIC | Age: 35
End: 2025-01-10

## 2025-06-21 ENCOUNTER — HEALTH MAINTENANCE LETTER (OUTPATIENT)
Age: 35
End: 2025-06-21